# Patient Record
Sex: FEMALE | Race: WHITE | Employment: FULL TIME | URBAN - NONMETROPOLITAN AREA
[De-identification: names, ages, dates, MRNs, and addresses within clinical notes are randomized per-mention and may not be internally consistent; named-entity substitution may affect disease eponyms.]

---

## 2017-01-26 ENCOUNTER — HOSPITAL ENCOUNTER (EMERGENCY)
Age: 47
Discharge: HOME | End: 2017-01-26
Payer: MEDICAID

## 2017-01-26 VITALS
SYSTOLIC BLOOD PRESSURE: 125 MMHG | HEART RATE: 111 BPM | DIASTOLIC BLOOD PRESSURE: 85 MMHG | RESPIRATION RATE: 18 BRPM | TEMPERATURE: 97.8 F | OXYGEN SATURATION: 97 %

## 2017-01-26 DIAGNOSIS — K52.9 GASTROENTERITIS: Primary | ICD-10-CM

## 2017-01-26 DIAGNOSIS — R82.71 BACTERIA IN URINE: ICD-10-CM

## 2017-01-26 LAB
ALBUMIN SERPL-MCNC: 4.3 G/DL (ref 3.5–5.2)
ALP BLD-CCNC: 81 U/L (ref 35–104)
ALT SERPL-CCNC: 15 U/L (ref 5–33)
ANION GAP SERPL CALCULATED.3IONS-SCNC: 14 MMOL/L (ref 7–19)
AST SERPL-CCNC: 14 U/L (ref 5–32)
BACTERIA: ABNORMAL /HPF
BILIRUB SERPL-MCNC: 0.6 MG/DL (ref 0.2–1.2)
BILIRUBIN URINE: NEGATIVE
BLOOD, URINE: ABNORMAL
BUN BLDV-MCNC: 11 MG/DL (ref 6–20)
CALCIUM SERPL-MCNC: 8.6 MG/DL (ref 8.6–10)
CHLORIDE BLD-SCNC: 103 MMOL/L (ref 98–111)
CLARITY: ABNORMAL
CO2: 25 MMOL/L (ref 22–29)
COLOR: YELLOW
CREAT SERPL-MCNC: 0.5 MG/DL (ref 0.5–0.9)
EPITHELIAL CELLS, UA: 6 /HPF (ref 0–5)
GFR NON-AFRICAN AMERICAN: >60
GLOBULIN: 3 G/DL
GLUCOSE BLD-MCNC: 142 MG/DL (ref 74–109)
GLUCOSE URINE: NEGATIVE MG/DL
HCG(URINE) PREGNANCY TEST: NEGATIVE
HCT VFR BLD CALC: 45.6 % (ref 37–47)
HEMOGLOBIN: 16.3 G/DL (ref 12–16)
HYALINE CASTS: 10 /HPF (ref 0–8)
KETONES, URINE: NEGATIVE MG/DL
LEUKOCYTE ESTERASE, URINE: ABNORMAL
LIPASE: 33 U/L (ref 13–60)
MCH RBC QN AUTO: 33.3 PG (ref 27–31)
MCHC RBC AUTO-ENTMCNC: 35.7 G/DL (ref 33–37)
MCV RBC AUTO: 93.3 FL (ref 81–99)
NITRITE, URINE: NEGATIVE
PDW BLD-RTO: 14.7 % (ref 11.5–14.5)
PH UA: 5.5
PLATELET # BLD: 184 K/UL (ref 130–400)
PMV BLD AUTO: 8.7 FL (ref 7.4–10.4)
POTASSIUM SERPL-SCNC: 4.2 MMOL/L (ref 3.5–5)
PROTEIN UA: NEGATIVE MG/DL
RAPID INFLUENZA  B AGN: NEGATIVE
RAPID INFLUENZA A AGN: NEGATIVE
RBC # BLD: 4.89 M/UL (ref 4.2–5.4)
RBC UA: 14 /HPF (ref 0–4)
SODIUM BLD-SCNC: 142 MMOL/L (ref 136–145)
SPECIFIC GRAVITY UA: 1.03
TOTAL PROTEIN: 7.3 G/DL (ref 6.6–8.7)
UROBILINOGEN, URINE: 0.2 E.U./DL
WBC # BLD: 13.1 K/UL (ref 4.8–10.8)
WBC UA: 2 /HPF (ref 0–5)

## 2017-01-26 PROCEDURE — 87086 URINE CULTURE/COLONY COUNT: CPT

## 2017-01-26 PROCEDURE — 87804 INFLUENZA ASSAY W/OPTIC: CPT

## 2017-01-26 PROCEDURE — 81001 URINALYSIS AUTO W/SCOPE: CPT

## 2017-01-26 PROCEDURE — 85027 COMPLETE CBC AUTOMATED: CPT

## 2017-01-26 PROCEDURE — 6360000002 HC RX W HCPCS: Performed by: NURSE PRACTITIONER

## 2017-01-26 PROCEDURE — 80053 COMPREHEN METABOLIC PANEL: CPT

## 2017-01-26 PROCEDURE — 83690 ASSAY OF LIPASE: CPT

## 2017-01-26 PROCEDURE — 99283 EMERGENCY DEPT VISIT LOW MDM: CPT

## 2017-01-26 PROCEDURE — 99282 EMERGENCY DEPT VISIT SF MDM: CPT | Performed by: NURSE PRACTITIONER

## 2017-01-26 PROCEDURE — 81025 URINE PREGNANCY TEST: CPT

## 2017-01-26 PROCEDURE — 2580000003 HC RX 258: Performed by: NURSE PRACTITIONER

## 2017-01-26 PROCEDURE — 96374 THER/PROPH/DIAG INJ IV PUSH: CPT

## 2017-01-26 PROCEDURE — 36415 COLL VENOUS BLD VENIPUNCTURE: CPT

## 2017-01-26 RX ORDER — ONDANSETRON 4 MG/1
4 TABLET, ORALLY DISINTEGRATING ORAL EVERY 8 HOURS PRN
Qty: 15 TABLET | Refills: 0 | Status: SHIPPED | OUTPATIENT
Start: 2017-01-26 | End: 2018-08-06

## 2017-01-26 RX ORDER — 0.9 % SODIUM CHLORIDE 0.9 %
1000 INTRAVENOUS SOLUTION INTRAVENOUS ONCE
Status: COMPLETED | OUTPATIENT
Start: 2017-01-26 | End: 2017-01-26

## 2017-01-26 RX ORDER — CIPROFLOXACIN 500 MG/1
500 TABLET, FILM COATED ORAL 2 TIMES DAILY
Qty: 10 TABLET | Refills: 0 | Status: SHIPPED | OUTPATIENT
Start: 2017-01-26 | End: 2017-01-31

## 2017-01-26 RX ORDER — ONDANSETRON 2 MG/ML
4 INJECTION INTRAMUSCULAR; INTRAVENOUS ONCE
Status: COMPLETED | OUTPATIENT
Start: 2017-01-26 | End: 2017-01-26

## 2017-01-26 RX ADMIN — ONDANSETRON 4 MG: 2 INJECTION INTRAMUSCULAR; INTRAVENOUS at 09:21

## 2017-01-26 RX ADMIN — SODIUM CHLORIDE 1000 ML: 9 INJECTION, SOLUTION INTRAVENOUS at 09:21

## 2017-01-26 ASSESSMENT — ENCOUNTER SYMPTOMS
DIARRHEA: 1
NAUSEA: 1
RESPIRATORY NEGATIVE: 1
VOMITING: 1

## 2017-01-28 LAB — URINE CULTURE, ROUTINE: NORMAL

## 2017-04-06 ENCOUNTER — OFFICE VISIT (OUTPATIENT)
Dept: RETAIL CLINIC | Facility: CLINIC | Age: 47
End: 2017-04-06

## 2017-04-06 VITALS
HEART RATE: 87 BPM | DIASTOLIC BLOOD PRESSURE: 84 MMHG | TEMPERATURE: 98.1 F | SYSTOLIC BLOOD PRESSURE: 110 MMHG | OXYGEN SATURATION: 98 %

## 2017-04-06 DIAGNOSIS — R31.9 URINARY TRACT INFECTION WITH HEMATURIA, SITE UNSPECIFIED: Primary | ICD-10-CM

## 2017-04-06 DIAGNOSIS — N39.0 URINARY TRACT INFECTION WITH HEMATURIA, SITE UNSPECIFIED: Primary | ICD-10-CM

## 2017-04-06 LAB
BILIRUB BLD-MCNC: NEGATIVE MG/DL
CLARITY, POC: ABNORMAL
COLOR UR: ABNORMAL
GLUCOSE UR STRIP-MCNC: NEGATIVE MG/DL
KETONES UR QL: NEGATIVE
LEUKOCYTE EST, POC: ABNORMAL
NITRITE UR-MCNC: NEGATIVE MG/ML
PH UR: 6.5 [PH] (ref 5–8)
PROT UR STRIP-MCNC: ABNORMAL MG/DL
RBC # UR STRIP: ABNORMAL /UL
SP GR UR: 1.02 (ref 1–1.03)
UROBILINOGEN UR QL: ABNORMAL

## 2017-04-06 PROCEDURE — 81003 URINALYSIS AUTO W/O SCOPE: CPT | Performed by: NURSE PRACTITIONER

## 2017-04-06 PROCEDURE — 99212 OFFICE O/P EST SF 10 MIN: CPT | Performed by: NURSE PRACTITIONER

## 2017-04-06 RX ORDER — PHENAZOPYRIDINE HYDROCHLORIDE 200 MG/1
200 TABLET, FILM COATED ORAL 3 TIMES DAILY PRN
Qty: 6 TABLET | Refills: 0 | Status: SHIPPED | OUTPATIENT
Start: 2017-04-06 | End: 2017-04-08

## 2017-04-06 RX ORDER — SULFAMETHOXAZOLE AND TRIMETHOPRIM 800; 160 MG/1; MG/1
1 TABLET ORAL 2 TIMES DAILY
Qty: 20 TABLET | Refills: 0 | Status: SHIPPED | OUTPATIENT
Start: 2017-04-06 | End: 2017-04-16

## 2017-04-06 NOTE — PATIENT INSTRUCTIONS
Increase fluid intake  Avoid caffeine and carbonation  May use Pyridium today and tomorrow; avoid use at this time to be sure symptoms are improving   Follow up with Urgent Care or other provider at completion of therapy for recheck  If symptoms worsen or you develop fever, follow up with Urgent care or other provider that can check urine and send for culture       Urinary Tract Infection, Adult  A urinary tract infection (UTI) is an infection of any part of the urinary tract, which includes the kidneys, ureters, bladder, and urethra. These organs make, store, and get rid of urine in the body. UTI can be a bladder infection (cystitis) or kidney infection (pyelonephritis).  CAUSES  This infection may be caused by fungi, viruses, or bacteria. Bacteria are the most common cause of UTIs. This condition can also be caused by repeated incomplete emptying of the bladder during urination.   RISK FACTORS  This condition is more likely to develop if:   · You ignore your need to urinate or hold urine for long periods of time.  · You do not empty your bladder completely during urination.  · You wipe back to front after urinating or having a bowel movement, if you are female.  · You are uncircumcised, if you are male.    · You are constipated.  · You have a urinary catheter that stays in place (indwelling).  · You have a weak defense (immune) system.  · You have a medical condition that affects your bowels, kidneys, or bladder.  · You have diabetes.  · You take antibiotic medicines frequently or for long periods of time, and the antibiotics no longer work well against certain types of infections (antibiotic resistance).  · You take medicines that irritate your urinary tract.  · You are exposed to chemicals that irritate your urinary tract.  · You are female.  SYMPTOMS   Symptoms of this condition include:   · Fever.    · Frequent urination or passing small amounts of urine frequently.    · Needing to urinate urgently.    · Pain  or burning with urination.    · Urine that smells bad or unusual.    · Cloudy urine.    · Pain in the lower abdomen or back.    · Trouble urinating.    · Blood in the urine.    · Vomiting or being less hungry than normal.     · Diarrhea or abdominal pain.    · Vaginal discharge, if you are female.  DIAGNOSIS  This condition is diagnosed with a medical history and physical exam. You will also need to provide a urine sample to test your urine. Other tests may be done, including:    · Blood tests.    · Sexually transmitted disease (STD) testing.     If you have had more than one UTI, a cystoscopy or imaging studies may be done to determine the cause of the infections.   TREATMENT   Treatment for this condition often includes a combination of two or more of the following:   · Antibiotic medicine.    · Other medicines to treat less common causes of UTI.    · Over-the-counter medicines to treat pain.    · Drinking enough water to stay hydrated.  HOME CARE INSTRUCTIONS  · Take over-the-counter and prescription medicines only as told by your health care provider.  · If you were prescribed an antibiotic, take it as told by your health care provider. Do not stop taking the antibiotic even if you start to feel better.  · Avoid alcohol, caffeine, tea, and carbonated beverages. They can irritate your bladder.  · Drink enough fluid to keep your urine clear or pale yellow.  · Keep all follow-up visits as told by your health care provider. This is important.  · Make sure to:    Empty your bladder often and completely. Do not hold urine for long periods of time.    Empty your bladder before and after sex.    Wipe from front to back after a bowel movement if you are female. Use each tissue one time when you wipe.  SEEK MEDICAL CARE IF:   · You have back pain.    · You have a fever.  · You feel nauseous or vomit.    · Your symptoms do not get better after 3 days.     · Your symptoms go away and then return.  SEEK IMMEDIATE MEDICAL  CARE IF:   · You have severe back pain or lower abdominal pain.    · You are vomiting and cannot keep down any medicines or water.     This information is not intended to replace advice given to you by your health care provider. Make sure you discuss any questions you have with your health care provider.     Document Released: 09/27/2006 Document Revised: 01/13/2017 Document Reviewed: 11/07/2016  [employer] Interactive Patient Education ©2016 [employer]

## 2017-04-06 NOTE — PROGRESS NOTES
Subjective   Ayanna Lugo is a 47 y.o. female.     Urinary Frequency    This is a recurrent (Last UTI in December) problem. The current episode started in the past 7 days (4-5 days; Has had to hold urine a lot over the weekend). The quality of the pain is described as burning. There has been no fever. Associated symptoms include frequency and urgency. Pertinent negatives include no flank pain, nausea or vomiting. Associated symptoms comments: Mild lower back pain  .        The following portions of the patient's history were reviewed and updated as appropriate: allergies, current medications, past family history, past medical history, past social history, past surgical history and problem list.    Review of Systems   Constitutional: Negative for fever.   Gastrointestinal: Negative for abdominal pain, nausea and vomiting.   Genitourinary: Positive for dysuria, frequency and urgency. Negative for flank pain, pelvic pain and vaginal bleeding.   Musculoskeletal: Positive for back pain (Mild lower back).       Objective   Physical Exam   Constitutional: She appears well-developed and well-nourished. No distress.   Cardiovascular: Normal rate, regular rhythm and normal heart sounds.  Exam reveals no gallop and no friction rub.    No murmur heard.  Pulmonary/Chest: Effort normal and breath sounds normal. No respiratory distress. She has no wheezes. She has no rales. She exhibits no tenderness.   Abdominal: Soft. She exhibits no distension. There is no tenderness. There is no CVA tenderness.   Neurological: She is alert.   Skin: Skin is warm. She is not diaphoretic.   Psychiatric: She has a normal mood and affect. Her behavior is normal.       Assessment/Plan   Ayanna was seen today for urinary frequency.    Diagnoses and all orders for this visit:    Urinary tract infection with hematuria, site unspecified  -     POC Urinalysis Dipstick, Automated  -     sulfamethoxazole-trimethoprim (BACTRIM DS,SEPTRA DS) 800-160 MG  per tablet; Take 1 tablet by mouth 2 (Two) Times a Day for 10 days.  -     phenazopyridine (PYRIDIUM) 200 MG tablet; Take 1 tablet by mouth 3 (Three) Times a Day As Needed for bladder spasms for up to 2 days.        Increase fluid intake  Avoid caffeine and carbonation  May use Pyridium today and tomorrow; avoid use at this time to be sure symptoms are improving   Follow up with Urgent Care or other provider at completion of therapy for recheck  If symptoms worsen or you develop fever, follow up with Urgent care or other provider that can check urine and send for culture

## 2017-08-29 ENCOUNTER — OFFICE VISIT (OUTPATIENT)
Dept: RETAIL CLINIC | Facility: CLINIC | Age: 47
End: 2017-08-29

## 2017-08-29 VITALS
DIASTOLIC BLOOD PRESSURE: 78 MMHG | OXYGEN SATURATION: 98 % | SYSTOLIC BLOOD PRESSURE: 138 MMHG | TEMPERATURE: 98.3 F | HEART RATE: 70 BPM

## 2017-08-29 DIAGNOSIS — J06.9 ACUTE URI: Primary | ICD-10-CM

## 2017-08-29 PROCEDURE — 99213 OFFICE O/P EST LOW 20 MIN: CPT | Performed by: NURSE PRACTITIONER

## 2017-08-29 RX ORDER — GUAIFENESIN 600 MG/1
1200 TABLET, EXTENDED RELEASE ORAL 2 TIMES DAILY
Qty: 40 TABLET | Refills: 0 | Status: SHIPPED | OUTPATIENT
Start: 2017-08-29 | End: 2017-09-08

## 2017-08-29 RX ORDER — METHYLPREDNISOLONE 4 MG/1
TABLET ORAL
Qty: 1 EACH | Refills: 0 | Status: SHIPPED | OUTPATIENT
Start: 2017-08-29 | End: 2019-02-19

## 2017-08-29 NOTE — PROGRESS NOTES
Subjective   Ayanna Lugo is a 47 y.o. female.     Cough   This is a new problem. The current episode started in the past 7 days (>2 days, on Sunday worked a double and started to feel worse. ). The problem has been gradually worsening. Associated symptoms include a fever (Has not checked; has felt feverish), headaches, nasal congestion and postnasal drip. Pertinent negatives include no ear pain (Some pressure) or shortness of breath. The symptoms are aggravated by lying down. Treatments tried: Mucinex Night time. The treatment provided mild relief. Her past medical history is significant for environmental allergies. There is no history of asthma, bronchitis or pneumonia.        The following portions of the patient's history were reviewed and updated as appropriate: allergies, current medications, past family history, past medical history, past social history, past surgical history and problem list.    Review of Systems   Constitutional: Positive for fever (Has not checked; has felt feverish). Negative for appetite change.   HENT: Positive for congestion, postnasal drip and sinus pressure. Negative for ear pain (Some pressure).    Eyes: Negative.    Respiratory: Positive for cough. Negative for shortness of breath.    Gastrointestinal: Negative for diarrhea, nausea and vomiting.   Allergic/Immunologic: Positive for environmental allergies.   Neurological: Positive for headaches.       Objective   Physical Exam   Constitutional: She appears well-developed and well-nourished. No distress.   HENT:   Right Ear: External ear normal. Tympanic membrane is not erythematous and not bulging.   Left Ear: External ear normal. Tympanic membrane is not erythematous and not bulging.   Nose: Right sinus exhibits maxillary sinus tenderness and frontal sinus tenderness. Left sinus exhibits maxillary sinus tenderness and frontal sinus tenderness.   Mouth/Throat: Posterior oropharyngeal erythema present. No oropharyngeal  exudate.   Neck: Neck supple.   Cardiovascular: Normal rate, regular rhythm and normal heart sounds.  Exam reveals no gallop and no friction rub.    No murmur heard.  Pulmonary/Chest: Effort normal and breath sounds normal. No respiratory distress. She has no wheezes. She has no rales.   Lymphadenopathy:     She has no cervical adenopathy.   Neurological: She is alert.   Skin: Skin is warm. She is not diaphoretic.   Psychiatric: She has a normal mood and affect. Her behavior is normal.       Assessment/Plan   Ayanna was seen today for cough.    Diagnoses and all orders for this visit:    Acute URI  -     MethylPREDNISolone (MEDROL, ROSANA,) 4 MG tablet; Take as directed on package instructions.  -     guaiFENesin (MUCINEX) 600 MG 12 hr tablet; Take 2 tablets by mouth 2 (Two) Times a Day for 10 days.      Increase fluid intake  Warm salt water gargles as needed for sore throat  Do not over suppress cough  Saline nasal spray  Plain Mucinex for cough/congestion   May use Nyquil at bedtime  If no improvement over next 2-3 days or symptoms worsen, follow up here or with PCP

## 2017-08-29 NOTE — PATIENT INSTRUCTIONS
"Increase fluid intake  Warm salt water gargles as needed for sore throat  Do not over suppress cough  Saline nasal spray  Plain Mucinex for cough/congestion   May use Nyquil at bedtime  If no improvement over next 2-3 days or symptoms worsen, follow up here or with PCP      Upper Respiratory Infection, Adult  Most upper respiratory infections (URIs) are a viral infection of the air passages leading to the lungs. A URI affects the nose, throat, and upper air passages. The most common type of URI is nasopharyngitis and is typically referred to as \"the common cold.\"  URIs run their course and usually go away on their own. Most of the time, a URI does not require medical attention, but sometimes a bacterial infection in the upper airways can follow a viral infection. This is called a secondary infection. Sinus and middle ear infections are common types of secondary upper respiratory infections.  Bacterial pneumonia can also complicate a URI. A URI can worsen asthma and chronic obstructive pulmonary disease (COPD). Sometimes, these complications can require emergency medical care and may be life threatening.   CAUSES  Almost all URIs are caused by viruses. A virus is a type of germ and can spread from one person to another.   RISKS FACTORS  You may be at risk for a URI if:   · You smoke.    · You have chronic heart or lung disease.  · You have a weakened defense (immune) system.    · You are very young or very old.    · You have nasal allergies or asthma.  · You work in crowded or poorly ventilated areas.  · You work in health care facilities or schools.  SIGNS AND SYMPTOMS   Symptoms typically develop 2-3 days after you come in contact with a cold virus. Most viral URIs last 7-10 days. However, viral URIs from the influenza virus (flu virus) can last 14-18 days and are typically more severe. Symptoms may include:   · Runny or stuffy (congested) nose.    · Sneezing.    · Cough.    · Sore throat.    · Headache. "    · Fatigue.    · Fever.    · Loss of appetite.    · Pain in your forehead, behind your eyes, and over your cheekbones (sinus pain).  · Muscle aches.    DIAGNOSIS   Your health care provider may diagnose a URI by:  · Physical exam.  · Tests to check that your symptoms are not due to another condition such as:  ¨ Strep throat.  ¨ Sinusitis.  ¨ Pneumonia.  ¨ Asthma.  TREATMENT   A URI goes away on its own with time. It cannot be cured with medicines, but medicines may be prescribed or recommended to relieve symptoms. Medicines may help:  · Reduce your fever.  · Reduce your cough.  · Relieve nasal congestion.  HOME CARE INSTRUCTIONS   · Take medicines only as directed by your health care provider.    · Gargle warm saltwater or take cough drops to comfort your throat as directed by your health care provider.  · Use a warm mist humidifier or inhale steam from a shower to increase air moisture. This may make it easier to breathe.  · Drink enough fluid to keep your urine clear or pale yellow.    · Eat soups and other clear broths and maintain good nutrition.    · Rest as needed.    · Return to work when your temperature has returned to normal or as your health care provider advises. You may need to stay home longer to avoid infecting others. You can also use a face mask and careful hand washing to prevent spread of the virus.  · Increase the usage of your inhaler if you have asthma.    · Do not use any tobacco products, including cigarettes, chewing tobacco, or electronic cigarettes. If you need help quitting, ask your health care provider.  PREVENTION   The best way to protect yourself from getting a cold is to practice good hygiene.   · Avoid oral or hand contact with people with cold symptoms.    · Wash your hands often if contact occurs.    There is no clear evidence that vitamin C, vitamin E, echinacea, or exercise reduces the chance of developing a cold. However, it is always recommended to get plenty of rest,  exercise, and practice good nutrition.   SEEK MEDICAL CARE IF:   · You are getting worse rather than better.    · Your symptoms are not controlled by medicine.    · You have chills.  · You have worsening shortness of breath.  · You have brown or red mucus.  · You have yellow or brown nasal discharge.  · You have pain in your face, especially when you bend forward.  · You have a fever.  · You have swollen neck glands.  · You have pain while swallowing.  · You have white areas in the back of your throat.  SEEK IMMEDIATE MEDICAL CARE IF:   · You have severe or persistent:    Headache.    Ear pain.    Sinus pain.    Chest pain.  · You have chronic lung disease and any of the following:    Wheezing.    Prolonged cough.    Coughing up blood.    A change in your usual mucus.  · You have a stiff neck.  · You have changes in your:    Vision.    Hearing.    Thinking.    Mood.  MAKE SURE YOU:   · Understand these instructions.  · Will watch your condition.  · Will get help right away if you are not doing well or get worse.     This information is not intended to replace advice given to you by your health care provider. Make sure you discuss any questions you have with your health care provider.     Document Released: 06/13/2002 Document Revised: 05/03/2016 Document Reviewed: 03/25/2015  [employer] Interactive Patient Education ©2017 [employer]

## 2017-10-25 ENCOUNTER — OFFICE VISIT (OUTPATIENT)
Dept: RETAIL CLINIC | Facility: CLINIC | Age: 47
End: 2017-10-25

## 2017-10-25 DIAGNOSIS — L73.1 PSEUDOFOLLICULITIS BARBAE: Primary | ICD-10-CM

## 2017-10-25 PROCEDURE — 99212 OFFICE O/P EST SF 10 MIN: CPT | Performed by: NURSE PRACTITIONER

## 2017-10-25 NOTE — PROGRESS NOTES
"  Chief Complaint   Patient presents with   • Wound Infection     Subjective   Ayanna Lugo is a 47 y.o. female who presents to the clinic today with complaints small lump where she shaved her pubic hair. It is at the top of the hair line. She used a razor from a friend who was treated for a boil and told likely was infected with staph. She just wants \"someone to look at it and make sure she doesn't have an infection.\"  Wound Infection   This is a new problem. The current episode started in the past 7 days. The problem occurs constantly. The problem has been unchanged. Pertinent negatives include no abdominal pain, anorexia, arthralgias, change in bowel habit, chest pain, chills, congestion, coughing, diaphoresis, fatigue, fever, headaches, joint swelling, myalgias, nausea, neck pain, numbness, rash, sore throat, swollen glands, urinary symptoms, vertigo, visual change, vomiting or weakness. Nothing aggravates the symptoms. She has tried nothing for the symptoms.         Current Outpatient Prescriptions:   •  Cyanocobalamin (VITAMIN B-12 PO), Take  by mouth., Disp: , Rfl:   •  MethylPREDNISolone (MEDROL, ROSANA,) 4 MG tablet, Take as directed on package instructions., Disp: 1 each, Rfl: 0  •  Multiple Vitamins-Minerals (MULTIVITAMIN ADULT PO), Take  by mouth., Disp: , Rfl:     Allergies:  Review of patient's allergies indicates no known allergies.    History reviewed. No pertinent past medical history.  Past Surgical History:   Procedure Laterality Date   • EXPLORATORY LAPAROTOMY     • TONSILLECTOMY       Family History   Problem Relation Age of Onset   • No Known Problems Mother    • No Known Problems Father      Social History   Substance Use Topics   • Smoking status: Current Every Day Smoker     Packs/day: 1.00     Types: Cigarettes   • Smokeless tobacco: None   • Alcohol use No       Review of Systems  Review of Systems   Constitutional: Negative for chills, diaphoresis, fatigue and fever.   HENT: Negative " for congestion and sore throat.    Respiratory: Negative for cough.    Cardiovascular: Negative for chest pain.   Gastrointestinal: Negative for abdominal pain, anorexia, change in bowel habit, nausea and vomiting.   Musculoskeletal: Negative for arthralgias, joint swelling, myalgias and neck pain.   Skin: Negative for rash.   Neurological: Negative for vertigo, weakness, numbness and headaches.       Objective   There were no vitals taken for this visit.      Physical Exam   Constitutional: She is oriented to person, place, and time. She appears well-developed and well-nourished.   Neurological: She is alert and oriented to person, place, and time.   Skin: Skin is warm and dry. Rash (At site of shaving on pubis area,small papules and mild erythema around papules at site of hair follicles. No obvious signs of furuncle or carbuncle. No signs of infection.  ) noted.   Psychiatric: She has a normal mood and affect. Her behavior is normal.   Vitals reviewed.      Assessment/Plan     Ayanna was seen today for wound infection.    Diagnoses and all orders for this visit:    Pseudofolliculitis barbae    Discussed with her no obvious signs of infection or boil. If boil were to develop, she would need to use warm moist compresses 3-4 times per day. If this does not resolve then she will need follow up. Do not use another person's razor or personal hygiene products.

## 2018-02-12 ENCOUNTER — OFFICE VISIT (OUTPATIENT)
Dept: URGENT CARE | Age: 48
End: 2018-02-12
Payer: MEDICAID

## 2018-02-12 VITALS
DIASTOLIC BLOOD PRESSURE: 70 MMHG | WEIGHT: 180 LBS | BODY MASS INDEX: 27.28 KG/M2 | HEART RATE: 102 BPM | TEMPERATURE: 99.1 F | OXYGEN SATURATION: 98 % | HEIGHT: 68 IN | SYSTOLIC BLOOD PRESSURE: 130 MMHG | RESPIRATION RATE: 20 BRPM

## 2018-02-12 DIAGNOSIS — R52 BODY ACHES: ICD-10-CM

## 2018-02-12 DIAGNOSIS — J02.9 SORE THROAT: ICD-10-CM

## 2018-02-12 DIAGNOSIS — B34.9 VIRAL ILLNESS: Primary | ICD-10-CM

## 2018-02-12 LAB
INFLUENZA A ANTIBODY: NEGATIVE
INFLUENZA B ANTIBODY: NEGATIVE
S PYO AG THROAT QL: NORMAL

## 2018-02-12 PROCEDURE — 87804 INFLUENZA ASSAY W/OPTIC: CPT | Performed by: NURSE PRACTITIONER

## 2018-02-12 PROCEDURE — 87880 STREP A ASSAY W/OPTIC: CPT | Performed by: NURSE PRACTITIONER

## 2018-02-12 PROCEDURE — 99213 OFFICE O/P EST LOW 20 MIN: CPT | Performed by: NURSE PRACTITIONER

## 2018-02-12 RX ORDER — BENZONATATE 100 MG/1
100 CAPSULE ORAL 3 TIMES DAILY PRN
Qty: 30 CAPSULE | Refills: 0 | Status: SHIPPED | OUTPATIENT
Start: 2018-02-12 | End: 2018-02-19

## 2018-02-12 RX ORDER — METHYLPREDNISOLONE 4 MG/1
TABLET ORAL
Qty: 1 KIT | Refills: 0 | Status: SHIPPED | OUTPATIENT
Start: 2018-02-12 | End: 2018-02-18

## 2018-02-12 ASSESSMENT — ENCOUNTER SYMPTOMS
GASTROINTESTINAL NEGATIVE: 1
COUGH: 1
SORE THROAT: 1

## 2018-02-12 NOTE — PATIENT INSTRUCTIONS
trouble breathing. ? · You passed out (lost consciousness). ?Call your doctor now or seek immediate medical care if:  ? · You seem to be getting much sicker. ? · You have a new or higher fever. ? · You have blood in your stools. ? · You have new belly pain, or your pain gets worse. ? · You have a new rash. ? Watch closely for changes in your health, and be sure to contact your doctor if:  ? · You start to get better and then get worse. ? · You do not get better as expected. Where can you learn more? Go to https://[employer] Sciencepepiceweb.[employer]. org and sign in to your [employer] account. Enter K879 in the Kitchenbug box to learn more about \"Viral Infections: Care Instructions. \"     If you do not have an account, please click on the \"Sign Up Now\" link. Current as of: March 3, 2017  Content Version: 11.5  © 4366-9300 [employer]. Care instructions adapted under license by [facility] ([facility]). If you have questions about a medical condition or this instruction, always ask your healthcare professional. [place] [address] any warranty or liability for your use of this information. 1. Rest and increase fluids  2. Take tessalon as needed for coughing  3. Take medrol as directed  4. Monitor for fever and treat as needed   5. Diatherix done in office and we will call with results  6.  If patient is not improving or developing any new/worsening symptoms then RTC, prn or follow up with PCP

## 2018-02-12 NOTE — PROGRESS NOTES
[address] [address]  Dept: [phone]  Loc: [phone]    Devon Ortiz is a 49 y.o. female who presents today for her medical conditions/complaints as noted below. Devon Ortiz is c/o of Fever; Generalized Body Aches; Chills; Pharyngitis; Cough; and Nasal Congestion        HPI:     HPI     Patient presents to office complaining of body aches, chills and fever that started today. She reports that her son was diagnosed with the flu last week. She reports sore throat and cough as well. She reports that she didn't check her temperature. She reports that her cough is somewhat productive. She reports one episode of vomiting on Saturday. She denies any diarrhea. She reports that she was treated for Flu A three weeks ago. History reviewed. No pertinent past medical history. History reviewed. No pertinent surgical history. History reviewed. No pertinent family history. Social History   Substance Use Topics    Smoking status: Current Every Day Smoker    Smokeless tobacco: Current User    Alcohol use No      Current Outpatient Prescriptions   Medication Sig Dispense Refill    methylPREDNISolone (MEDROL, TYRA,) 4 MG tablet Take by mouth. 1 kit 0    benzonatate (TESSALON PERLES) 100 MG capsule Take 1 capsule by mouth 3 times daily as needed for Cough 30 capsule 0     No current facility-administered medications for this visit. No Known Allergies    Health Maintenance   Topic Date Due    HIV screen  03/12/1984    DTaP/Tdap/Td vaccine (1 - Tdap) 03/12/1988    Cervical cancer screen  03/12/1990    Lipid screen  03/12/2009    Diabetes screen  03/12/2009    Flu vaccine (1) 09/01/2017       Subjective:      Review of Systems   Constitutional: Positive for chills and fever. Body aches     HENT: Positive for sore throat. Respiratory: Positive for cough. Cardiovascular: Negative.

## 2018-02-14 ENCOUNTER — TELEPHONE (OUTPATIENT)
Dept: URGENT CARE | Age: 48
End: 2018-02-14

## 2018-08-06 ENCOUNTER — OFFICE VISIT (OUTPATIENT)
Dept: URGENT CARE | Age: 48
End: 2018-08-06
Payer: MEDICAID

## 2018-08-06 VITALS
WEIGHT: 190 LBS | RESPIRATION RATE: 20 BRPM | BODY MASS INDEX: 29.82 KG/M2 | SYSTOLIC BLOOD PRESSURE: 158 MMHG | HEART RATE: 88 BPM | DIASTOLIC BLOOD PRESSURE: 81 MMHG | HEIGHT: 67 IN | TEMPERATURE: 98.5 F | OXYGEN SATURATION: 98 %

## 2018-08-06 DIAGNOSIS — R03.0 ELEVATED BLOOD PRESSURE READING: ICD-10-CM

## 2018-08-06 DIAGNOSIS — J02.9 SORE THROAT: ICD-10-CM

## 2018-08-06 DIAGNOSIS — J01.90 ACUTE SINUSITIS, RECURRENCE NOT SPECIFIED, UNSPECIFIED LOCATION: Primary | ICD-10-CM

## 2018-08-06 DIAGNOSIS — J02.9 ACUTE PHARYNGITIS, UNSPECIFIED ETIOLOGY: ICD-10-CM

## 2018-08-06 DIAGNOSIS — F17.200 SMOKER: ICD-10-CM

## 2018-08-06 LAB — S PYO AG THROAT QL: NORMAL

## 2018-08-06 PROCEDURE — 99213 OFFICE O/P EST LOW 20 MIN: CPT | Performed by: SPECIALIST

## 2018-08-06 PROCEDURE — 87880 STREP A ASSAY W/OPTIC: CPT | Performed by: SPECIALIST

## 2018-08-06 RX ORDER — AMOXICILLIN 875 MG/1
875 TABLET, COATED ORAL 2 TIMES DAILY
Qty: 20 TABLET | Refills: 0 | Status: SHIPPED | OUTPATIENT
Start: 2018-08-06 | End: 2018-08-16

## 2018-08-06 ASSESSMENT — ENCOUNTER SYMPTOMS
SORE THROAT: 1
SINUS PRESSURE: 1
SINUS PAIN: 1

## 2018-08-06 NOTE — PROGRESS NOTES
 POCT rapid strep A       No Follow-up on file. Orders Placed This Encounter   Procedures   [facility] Yaya Holguin MD     Referral Priority:   Routine     Referral Type:   Eval and Treat     Referral Reason:   Specialty Services Required     Referred to Provider:   Faraz Todd MD     Requested Specialty:   Family Medicine     Number of Visits Requested:   1    POCT rapid strep A     Orders Placed This Encounter   Medications    amoxicillin (AMOXIL) 875 MG tablet     Sig: Take 1 tablet by mouth 2 times daily for 10 days     Dispense:  20 tablet     Refill:  0       Patient given educational materials - see patient instructions. Discussed use, benefit, and side effects of prescribed medications. All patient questions answered. Pt voiced understanding. Reviewed health maintenance. Instructed to continue current medications, diet and exercise. Patient agreed with treatment plan. Follow up as directed. Patient Instructions       Patient Education        Elevated Blood Pressure: Care Instructions  Your Care Instructions    Blood pressure is a measure of how hard the blood pushes against the walls of your arteries. It's normal for blood pressure to go up and down throughout the day. But if it stays up over time, you have high blood pressure. Two numbers tell you your blood pressure. The first number is the systolic pressure. It shows how hard the blood pushes when your heart is pumping. The second number is the diastolic pressure. It shows how hard the blood pushes between heartbeats, when your heart is relaxed and filling with blood. An ideal blood pressure in adults is less than 120/80 (say \"120 over 80\"). High blood pressure is 140/90 or higher. You have high blood pressure if your top number is 140 or higher or your bottom number is 90 or higher, or both. The main test for high blood pressure is simple, fast, and painless.  To diagnose high blood pressure, your doctor will test your blood pressure at different times. After testing your blood pressure, your doctor may ask you to test it again when you are home. If you are diagnosed with high blood pressure, you can work with your doctor to make a long-term plan to manage it. Follow-up care is a key part of your treatment and safety. Be sure to make and go to all appointments, and call your doctor if you are having problems. It's also a good idea to know your test results and keep a list of the medicines you take. How can you care for yourself at home? · Do not smoke. Smoking increases your risk for heart attack and stroke. If you need help quitting, talk to your doctor about stop-smoking programs and medicines. These can increase your chances of quitting for good. · Stay at a healthy weight. · Try to limit how much sodium you eat to less than 2,300 milligrams (mg) a day. Your doctor may ask you to try to eat less than 1,500 mg a day. · Be physically active. Get at least 30 minutes of exercise on most days of the week. Walking is a good choice. You also may want to do other activities, such as running, swimming, cycling, or playing tennis or team sports. · Avoid or limit alcohol. Talk to your doctor about whether you can drink any alcohol. · Eat plenty of fruits, vegetables, and low-fat dairy products. Eat less saturated and total fats. · Learn how to check your blood pressure at home. When should you call for help? Call your doctor now or seek immediate medical care if:    · Your blood pressure is much higher than normal (such as 180/110 or higher).     · You think high blood pressure is causing symptoms such as:  ¨ Severe headache. ¨ Blurry vision.    Watch closely for changes in your health, and be sure to contact your doctor if:    · You do not get better as expected. Where can you learn more? Go to https://denilson.AlphaBoost. org and sign in to your eGames account.  Enter H804 in the Search Health Information box to learn more about \"Elevated Blood Pressure: Care Instructions. \"     If you do not have an account, please click on the \"Sign Up Now\" link. Current as of: May 10, 2017  Content Version: 11.6  © 3622-4529 Etix. Care instructions adapted under license by [facility] ([facility]). If you have questions about a medical condition or this instruction, always ask your healthcare professional. [address] any warranty or liability for your use of this information. Patient Education        Sinusitis: Care Instructions  Your Care Instructions    Sinusitis is an infection of the lining of the sinus cavities in your head. Sinusitis often follows a cold. It causes pain and pressure in your head and face. In most cases, sinusitis gets better on its own in 1 to 2 weeks. But some mild symptoms may last for several weeks. Sometimes antibiotics are needed. Follow-up care is a key part of your treatment and safety. Be sure to make and go to all appointments, and call your doctor if you are having problems. It's also a good idea to know your test results and keep a list of the medicines you take. How can you care for yourself at home? · Take an over-the-counter pain medicine, such as acetaminophen (Tylenol), ibuprofen (Advil, Motrin), or naproxen (Aleve). Read and follow all instructions on the label. · If the doctor prescribed antibiotics, take them as directed. Do not stop taking them just because you feel better. You need to take the full course of antibiotics. · Be careful when taking over-the-counter cold or flu medicines and Tylenol at the same time. Many of these medicines have acetaminophen, which is Tylenol. Read the labels to make sure that you are not taking more than the recommended dose. Too much acetaminophen (Tylenol) can be harmful. · Breathe warm, moist air from a steamy shower, a hot bath, or a sink filled with hot water. Avoid cold, dry air.  Using a vaporizer or humidifier to add moisture to your bedroom. Follow the directions for cleaning the machine. When should you call for help? Call your doctor now or seek immediate medical care if:    · You have new or worse trouble swallowing.     · Your sore throat gets much worse on one side.    Watch closely for changes in your health, and be sure to contact your doctor if you do not get better as expected. Where can you learn more? Go to https://chpesaran.Kngine. org and sign in to your GlobalLogic account. Enter I330 in the Connectloud box to learn more about \"Sore Throat: Care Instructions. \"     If you do not have an account, please click on the \"Sign Up Now\" link. Current as of: May 12, 2017  Content Version: 11.6  © 1087-9144 SnowBall. Care instructions adapted under license by [facility] [address] If you have questions about a medical condition or this instruction, always ask your healthcare professional. [place] [address] any warranty or liability for your use of this information. Patient Education        Stopping Smoking: Care Instructions  Your Care Instructions  Cigarette smokers crave the nicotine in cigarettes. Giving it up is much harder than simply changing a habit. Your body has to stop craving the nicotine. It is hard to quit, but you can do it. There are many tools that people use to quit smoking. You may find that combining tools works best for you. There are several steps to quitting. First you get ready to quit. Then you get support to help you. After that, you learn new skills and behaviors to become a nonsmoker. For many people, a necessary step is getting and using medicine. Your doctor will help you set up the plan that best meets your needs. You may want to attend a smoking cessation program to help you quit smoking. When you choose a program, look for one that has proven success. Ask your doctor for ideas.  You will greatly increase your use of this information. 1.  STOP taking decongestants and Ibuprofen. These can raise your blood pressure. 2.  Try to stop smoking  3. Push water  4. Take Tylenol for pain/fever. 5.  Follow up at [facility] for your blood pressure.         Electronically signed by TRACY Savage NP on 8/6/2018 at 4:07 PM

## 2018-08-06 NOTE — PATIENT INSTRUCTIONS
and go to all appointments, and call your doctor if you are having problems. It's also a good idea to know your test results and keep a list of the medicines you take. How can you care for yourself at home? · Take an over-the-counter pain medicine, such as acetaminophen (Tylenol), ibuprofen (Advil, Motrin), or naproxen (Aleve). Read and follow all instructions on the label. · If the doctor prescribed antibiotics, take them as directed. Do not stop taking them just because you feel better. You need to take the full course of antibiotics. · Be careful when taking over-the-counter cold or flu medicines and Tylenol at the same time. Many of these medicines have acetaminophen, which is Tylenol. Read the labels to make sure that you are not taking more than the recommended dose. Too much acetaminophen (Tylenol) can be harmful. · Breathe warm, moist air from a steamy shower, a hot bath, or a sink filled with hot water. Avoid cold, dry air. Using a humidifier in your home may help. Follow the directions for cleaning the machine. · Use saline (saltwater) nasal washes to help keep your nasal passages open and wash out mucus and bacteria. You can buy saline nose drops at a grocery store or drugstore. Or you can make your own at home by adding 1 teaspoon of salt and 1 teaspoon of baking soda to 2 cups of distilled water. If you make your own, fill a bulb syringe with the solution, insert the tip into your nostril, and squeeze gently. [place]f [place] your nose. · Put a hot, wet towel or a warm gel pack on your face 3 or 4 times a day for 5 to 10 minutes each time. · Try a decongestant nasal spray like oxymetazoline (Afrin). Do not use it for more than 3 days in a row. Using it for more than 3 days can make your congestion worse. When should you call for help?   Call your doctor now or seek immediate medical care if:    · You have new or worse swelling or redness in your face or around your eyes.     · You have a new or higher fever.    Watch closely for changes in your health, and be sure to contact your doctor if:    · You have new or worse facial pain.     · The mucus from your nose becomes thicker (like pus) or has new blood in it.     · You are not getting better as expected. Where can you learn more? Go to https://chpepiceweb.1RP Media. org and sign in to your Tactiga account. Enter C970 in the [employer] box to learn more about \"Sinusitis: Care Instructions. \"     If you do not have an account, please click on the \"Sign Up Now\" link. Current as of: May 12, 2017  Content Version: 11.6  © 0691-3577 [employer]. Care instructions adapted under license by [facility] ([facility]). If you have questions about a medical condition or this instruction, always ask your healthcare professional. [place] [address] any warranty or liability for your use of this information. Patient Education        Sore Throat: Care Instructions  Your Care Instructions    Infection by bacteria or a virus causes most sore throats. Cigarette smoke, dry air, air pollution, allergies, and yelling can also cause a sore throat. Sore throats can be painful and annoying. Fortunately, most sore throats go away on their own. If you have a bacterial infection, your doctor may prescribe antibiotics. Follow-up care is a key part of your treatment and safety. Be sure to make and go to all appointments, and call your doctor if you are having problems. It's also a good idea to know your test results and keep a list of the medicines you take. How can you care for yourself at home? · If your doctor prescribed antibiotics, take them as directed. Do not stop taking them just because you feel better. You need to take the full course of antibiotics. · Gargle with warm salt water once an hour to help reduce swelling and relieve discomfort. Use 1 teaspoon of salt mixed in 1 cup of warm water.   · Take an over-the-counter pain medicine, such as acetaminophen (Tylenol), ibuprofen (Advil, Motrin), or naproxen (Aleve). Read and follow all instructions on the label. · Be careful when taking over-the-counter cold or flu medicines and Tylenol at the same time. Many of these medicines have acetaminophen, which is Tylenol. Read the labels to make sure that you are not taking more than the recommended dose. Too much acetaminophen (Tylenol) can be harmful. · Drink plenty of fluids. Fluids may help soothe an irritated throat. Hot fluids, such as tea or soup, may help decrease throat pain. · Use over-the-counter throat lozenges to soothe pain. Regular cough drops or hard candy may also help. These should not be given to young children because of the risk of choking. · Do not smoke or allow others to smoke around you. If you need help quitting, talk to your doctor about stop-smoking programs and medicines. These can increase your chances of quitting for good. · Use a vaporizer or humidifier to add moisture to your bedroom. Follow the directions for cleaning the machine. When should you call for help? Call your doctor now or seek immediate medical care if:    · You have new or worse trouble swallowing.     · Your sore throat gets much worse on one side.    Watch closely for changes in your health, and be sure to contact your doctor if you do not get better as expected. Where can you learn more? Go to https://tapvivapeeliseoeweb.Convergent Dental. org and sign in to your Egodeus account. Enter J244 in the Flo Water box to learn more about \"Sore Throat: Care Instructions. \"     If you do not have an account, please click on the \"Sign Up Now\" link. Current as of: May 12, 2017  Content Version: 11.6  © 7583-3752 P2P-Next, [employer]orporated. Care instructions adapted under license by [facility] ([facility]).  If you have questions about a medical condition or this instruction, always ask your healthcare professional. Priti Argueta disclaims any warranty or will help your body move past its craving for nicotine. · Be prepared to keep trying. Most people are not successful the first few times they try to quit. Do not get mad at yourself if you smoke again. Make a list of things you learned and think about when you want to try again, such as next week, next month, or next year. Where can you learn more? Go to https://MapMyIndiapeNuenz.LIQUITY. org and sign in to your Carmenta Bioscience account. Enter V775 in the [facility] box to learn more about \"Stopping Smoking: Care Instructions. \"     If you do not have an account, please click on the \"Sign Up Now\" link. Current as of: November 29, 2017  Content Version: 11.6  © 3751-3709 SMS GupShup, Incorporated. Care instructions adapted under license by [facility] ([facility]). If you have questions about a medical condition or this instruction, always ask your healthcare professional. [place] [address] any warranty or liability for your use of this information. 1.  STOP taking decongestants and Ibuprofen. These can raise your blood pressure. 2.  Try to stop smoking  3. Push water  4. Take Tylenol for pain/fever. 5.  Follow up at [facility] for your blood pressure.

## 2018-08-30 ENCOUNTER — OFFICE VISIT (OUTPATIENT)
Dept: RETAIL CLINIC | Facility: CLINIC | Age: 48
End: 2018-08-30

## 2018-08-30 VITALS — DIASTOLIC BLOOD PRESSURE: 84 MMHG | TEMPERATURE: 97.6 F | HEART RATE: 76 BPM | SYSTOLIC BLOOD PRESSURE: 128 MMHG

## 2018-08-30 DIAGNOSIS — H10.31 ACUTE CONJUNCTIVITIS OF RIGHT EYE, UNSPECIFIED ACUTE CONJUNCTIVITIS TYPE: Primary | ICD-10-CM

## 2018-08-30 PROCEDURE — 99213 OFFICE O/P EST LOW 20 MIN: CPT | Performed by: NURSE PRACTITIONER

## 2018-08-30 RX ORDER — POLYMYXIN B SULFATE AND TRIMETHOPRIM 1; 10000 MG/ML; [USP'U]/ML
1 SOLUTION OPHTHALMIC 4 TIMES DAILY
Qty: 10 ML | Refills: 0 | Status: SHIPPED | OUTPATIENT
Start: 2018-08-30 | End: 2018-09-06

## 2018-08-30 NOTE — PROGRESS NOTES
Subjective     CHANELLE Lugo is a 48 y.o. male who presents to the clinic with: c/o right eye discomfort and irritation. She says it felt like something was in here eye all day yesterday and then this morning she woke up with it irritated and swollen and now its tender.       Conjunctivitis    The current episode started yesterday. The onset was sudden. The problem occurs continuously. The problem has been gradually worsening. The problem is mild. Nothing relieves the symptoms. The symptoms are aggravated by movement and light. Associated symptoms include eye itching, photophobia, eye discharge, eye pain and eye redness. Pertinent negatives include no fever, no decreased vision, no double vision, no congestion, no ear pain, no headaches and no rhinorrhea. The right eye is affected. The eye pain is associated with movement. The eyelid exhibits redness.          The following portions of the patient's history were reviewed and updated as appropriate: allergies, current medications, past family history, past medical history, past social history, past surgical history and problem list.      Review of Systems   Constitutional: Negative for fever.   HENT: Negative for congestion, ear pain, facial swelling and rhinorrhea.    Eyes: Positive for photophobia, pain, discharge, redness and itching. Negative for double vision.   Gastrointestinal: Negative.    Musculoskeletal: Negative.    Skin: Negative.    Neurological: Negative for dizziness and headaches.         Objective   Physical Exam   Constitutional: She is oriented to person, place, and time. She appears well-developed and well-nourished. No distress.   HENT:   Head: Normocephalic.   Nose: Nose normal.   Mouth/Throat: Oropharynx is clear and moist.   Eyes: Pupils are equal, round, and reactive to light. EOM and lids are normal. Right eye exhibits no discharge, no exudate and no hordeolum. Right conjunctiva is injected. Right conjunctiva has no hemorrhage. Left  conjunctiva is not injected.   Mild swelling noted on the inner corner of the bottom right lid    Cardiovascular: Normal rate and regular rhythm.    Pulmonary/Chest: Effort normal and breath sounds normal.   Neurological: She is alert and oriented to person, place, and time.   Skin: Skin is warm and dry.   Psychiatric: She has a normal mood and affect. Her behavior is normal.   Vitals reviewed.        Assessment/Plan   CHANELLE was seen today for conjunctivitis.    Diagnoses and all orders for this visit:    Acute conjunctivitis of right eye, unspecified acute conjunctivitis type    Other orders  -     trimethoprim-polymyxin b (POLYTRIM) 57733-9.1 UNIT/ML-% ophthalmic solution; Administer 1 drop to the right eye 4 (Four) Times a Day for 7 days.    Use eye drops as prescribed and follow treatment plan as discussed. Follow up with an eye doctor if symptoms are no better in 48-72 or if worse at anytime. Patient verbalized understanding.

## 2018-08-30 NOTE — PATIENT INSTRUCTIONS
Bacterial Conjunctivitis  Bacterial conjunctivitis is an infection of your conjunctiva. This is the clear membrane that covers the white part of your eye and the inner surface of your eyelid. This condition can make your eye:  · Red or pink.  · Itchy.    This condition is caused by bacteria. This condition spreads very easily from person to person (is contagious) and from one eye to the other eye.  Follow these instructions at home:  Medicines  · Take or apply your antibiotic medicine as told by your doctor. Do not stop taking or applying the antibiotic even if you start to feel better.  · Take or apply over-the-counter and prescription medicines only as told by your doctor.  · Do not touch your eyelid with the eye drop bottle or the ointment tube.  Managing discomfort  · Wipe any fluid from your eye with a warm, wet washcloth or a cotton ball.  · Place a cool, clean washcloth on your eye. Do this for 10-20 minutes, 3-4 times per day.  General instructions  · Do not wear contact lenses until the irritation is gone. Wear glasses until your doctor says it is okay to wear contacts.  · Do not wear eye makeup until your symptoms are gone. Throw away any old makeup.  · Change or wash your pillowcase every day.  · Do not share towels or washcloths with anyone.  · Wash your hands often with soap and water. Use paper towels to dry your hands.  · Do not touch or rub your eyes.  · Do not drive or use heavy machinery if your vision is blurry.  Contact a doctor if:  · You have a fever.  · Your symptoms do not get better after 10 days.  Get help right away if:  · You have a fever and your symptoms suddenly get worse.  · You have very bad pain when you move your eye.  · Your face:  ? Hurts.  ? Is red.  ? Is swollen.  · You have sudden loss of vision.  This information is not intended to replace advice given to you by your health care provider. Make sure you discuss any questions you have with your health care provider.  Document  Released: 09/26/2009 Document Revised: 05/25/2017 Document Reviewed: 09/29/2016  Elsevier Interactive Patient Education © 2018 Elsevier Inc.

## 2018-10-31 ENCOUNTER — OFFICE VISIT (OUTPATIENT)
Dept: RETAIL CLINIC | Facility: CLINIC | Age: 48
End: 2018-10-31

## 2018-10-31 VITALS
SYSTOLIC BLOOD PRESSURE: 116 MMHG | HEART RATE: 91 BPM | DIASTOLIC BLOOD PRESSURE: 80 MMHG | OXYGEN SATURATION: 98 % | TEMPERATURE: 97.5 F

## 2018-10-31 DIAGNOSIS — H60.501 ACUTE OTITIS EXTERNA OF RIGHT EAR, UNSPECIFIED TYPE: Primary | ICD-10-CM

## 2018-10-31 PROCEDURE — 99213 OFFICE O/P EST LOW 20 MIN: CPT | Performed by: NURSE PRACTITIONER

## 2018-10-31 PROCEDURE — 69209 REMOVE IMPACTED EAR WAX UNI: CPT | Performed by: NURSE PRACTITIONER

## 2018-10-31 NOTE — PROGRESS NOTES
Procedure   Ear Cerumen Removal Instrumentation  Date/Time: 10/31/2018 5:21 PM  Performed by: BRENDA MAY  Authorized by: BRENDA MAY   Consent: Verbal consent obtained.  Risks and benefits: risks, benefits and alternatives were discussed  Consent given by: patient  Patient understanding: patient states understanding of the procedure being performed  Location details: right ear  Patient tolerance: Patient tolerated the procedure well with no immediate complications  Procedure type: irrigation

## 2018-10-31 NOTE — PROGRESS NOTES
"Subjective   Ayanna Lugo is a 48 y.o. female.     About 3 days ago patient states she took a shower and felt like maybe the water didn't come all the way out.  Then is felt like it \"closed up.\"  She denies any drainage from the ear but does report soreness to and around the ear.        Earache    There is pain in the right ear. This is a new problem. The current episode started in the past 7 days. The problem has been gradually worsening. There has been no fever. Pertinent negatives include no coughing, diarrhea, ear discharge, rhinorrhea or vomiting. Treatments tried: Ibuprofen. The treatment provided moderate relief.        The following portions of the patient's history were reviewed and updated as appropriate: allergies, current medications, past family history, past medical history, past social history, past surgical history and problem list.    Review of Systems   Constitutional: Negative for fever.   HENT: Positive for congestion (\"maybe a little\") and ear pain. Negative for ear discharge and rhinorrhea.    Eyes: Negative for discharge.   Respiratory: Negative for cough.    Gastrointestinal: Negative for diarrhea, nausea and vomiting.   Allergic/Immunologic: Negative for environmental allergies.   Neurological: Negative for headache.       Objective   Physical Exam   Constitutional: She appears well-developed and well-nourished. She does not appear ill. No distress.   HENT:   Right Ear: External ear normal. There is swelling and tenderness. No drainage (minimal white exudate noted to canal floor). Tympanic membrane is not erythematous (Pink). Impacted cerumen: Ball of cerumen against TM.  Left Ear: External ear normal. No drainage, swelling or tenderness. Tympanic membrane is not erythematous.   Mouth/Throat: Oropharynx is clear and moist. No oropharyngeal exudate or posterior oropharyngeal erythema.   Neck: Neck supple.   Cardiovascular: Normal rate, regular rhythm and normal heart sounds.  Exam " reveals no gallop and no friction rub.    No murmur heard.  Pulmonary/Chest: Effort normal and breath sounds normal. No respiratory distress. She has no wheezes. She has no rales.   Lymphadenopathy:        Head (right side): No preauricular and no posterior auricular adenopathy present.        Head (left side): No preauricular and no posterior auricular adenopathy present.     She has no cervical adenopathy.   Neurological: She is alert.   Skin: Skin is warm and dry. She is not diaphoretic.   Psychiatric: She has a normal mood and affect. Her behavior is normal.         Assessment/Plan   Ayanna was seen today for earache.    Diagnoses and all orders for this visit:    Acute otitis externa of right ear, unspecified type  -     neomycin-polymyxin-hydrocortisone (CORTISPORIN) [id number] otic solution; Administer 3 drops to the right ear 4 (Four) Times a Day for 7 days.    Cerumen deep in canal and unable to get good full look at TM/Canal.  Cerumen removed via irrigation with little effort    Avoid getting water in the ear  If there is no improvement over next 2-3 days or symptoms worsen (fever, swollen lymph nodes, more canal swelling, etc) return for recheck

## 2019-01-05 ENCOUNTER — OFFICE VISIT (OUTPATIENT)
Dept: URGENT CARE | Age: 49
End: 2019-01-05
Payer: MEDICAID

## 2019-01-05 VITALS
WEIGHT: 192.4 LBS | BODY MASS INDEX: 30.2 KG/M2 | RESPIRATION RATE: 20 BRPM | SYSTOLIC BLOOD PRESSURE: 134 MMHG | OXYGEN SATURATION: 98 % | DIASTOLIC BLOOD PRESSURE: 78 MMHG | HEART RATE: 89 BPM | TEMPERATURE: 97.8 F | HEIGHT: 67 IN

## 2019-01-05 DIAGNOSIS — R05.8 COUGH PRODUCTIVE OF YELLOW SPUTUM: ICD-10-CM

## 2019-01-05 DIAGNOSIS — R52 BODY ACHES: ICD-10-CM

## 2019-01-05 DIAGNOSIS — F17.200 SMOKER: ICD-10-CM

## 2019-01-05 DIAGNOSIS — J06.9 UPPER RESPIRATORY TRACT INFECTION, UNSPECIFIED TYPE: Primary | ICD-10-CM

## 2019-01-05 LAB
INFLUENZA A ANTIBODY: NEGATIVE
INFLUENZA B ANTIBODY: NEGATIVE
S PYO AG THROAT QL: NORMAL

## 2019-01-05 PROCEDURE — 87880 STREP A ASSAY W/OPTIC: CPT | Performed by: SPECIALIST

## 2019-01-05 PROCEDURE — 99213 OFFICE O/P EST LOW 20 MIN: CPT | Performed by: SPECIALIST

## 2019-01-05 PROCEDURE — 87804 INFLUENZA ASSAY W/OPTIC: CPT | Performed by: SPECIALIST

## 2019-01-05 RX ORDER — FLUTICASONE PROPIONATE 50 MCG
2 SPRAY, SUSPENSION (ML) NASAL DAILY
Qty: 1 BOTTLE | Refills: 0 | Status: SHIPPED | OUTPATIENT
Start: 2019-01-05

## 2019-01-05 RX ORDER — BENZONATATE 200 MG/1
200 CAPSULE ORAL 2 TIMES DAILY PRN
Qty: 20 CAPSULE | Refills: 0 | Status: SHIPPED | OUTPATIENT
Start: 2019-01-05 | End: 2019-01-15

## 2019-01-05 RX ORDER — DEXAMETHASONE SODIUM PHOSPHATE 10 MG/ML
10 INJECTION INTRAMUSCULAR; INTRAVENOUS ONCE
Status: COMPLETED | OUTPATIENT
Start: 2019-01-05 | End: 2019-01-05

## 2019-01-05 RX ORDER — METHYLPREDNISOLONE 4 MG/1
TABLET ORAL
Qty: 1 KIT | Refills: 0 | Status: SHIPPED | OUTPATIENT
Start: 2019-01-05 | End: 2019-06-09 | Stop reason: SDUPTHER

## 2019-01-05 RX ORDER — CLARITHROMYCIN 500 MG/1
500 TABLET, COATED ORAL 2 TIMES DAILY
Qty: 20 TABLET | Refills: 0 | Status: SHIPPED | OUTPATIENT
Start: 2019-01-05 | End: 2019-01-15

## 2019-01-05 RX ORDER — IBUPROFEN 600 MG/1
600 TABLET ORAL EVERY 6 HOURS PRN
COMMUNITY

## 2019-01-05 RX ADMIN — DEXAMETHASONE SODIUM PHOSPHATE 10 MG: 10 INJECTION INTRAMUSCULAR; INTRAVENOUS at 15:39

## 2019-01-05 ASSESSMENT — ENCOUNTER SYMPTOMS
COUGH: 1
SORE THROAT: 1

## 2019-02-19 ENCOUNTER — OFFICE VISIT (OUTPATIENT)
Dept: RETAIL CLINIC | Facility: CLINIC | Age: 49
End: 2019-02-19

## 2019-02-19 VITALS
TEMPERATURE: 98 F | WEIGHT: 189 LBS | RESPIRATION RATE: 16 BRPM | SYSTOLIC BLOOD PRESSURE: 138 MMHG | DIASTOLIC BLOOD PRESSURE: 98 MMHG | HEART RATE: 86 BPM | OXYGEN SATURATION: 98 %

## 2019-02-19 DIAGNOSIS — B34.9 VIRAL ILLNESS: Primary | ICD-10-CM

## 2019-02-19 LAB
EXPIRATION DATE: NORMAL
EXPIRATION DATE: NORMAL
FLUAV AG NPH QL: NEGATIVE
FLUBV AG NPH QL: NEGATIVE
INTERNAL CONTROL: NORMAL
INTERNAL CONTROL: NORMAL
Lab: NORMAL
Lab: NORMAL
S PYO AG THROAT QL: NEGATIVE

## 2019-02-19 PROCEDURE — 99213 OFFICE O/P EST LOW 20 MIN: CPT | Performed by: NURSE PRACTITIONER

## 2019-02-19 PROCEDURE — 87880 STREP A ASSAY W/OPTIC: CPT | Performed by: NURSE PRACTITIONER

## 2019-02-19 PROCEDURE — 87804 INFLUENZA ASSAY W/OPTIC: CPT | Performed by: NURSE PRACTITIONER

## 2019-02-19 RX ORDER — ONDANSETRON HYDROCHLORIDE 8 MG/1
8 TABLET, FILM COATED ORAL EVERY 8 HOURS PRN
Qty: 15 TABLET | Refills: 0 | Status: SHIPPED | OUTPATIENT
Start: 2019-02-19 | End: 2019-11-04

## 2019-02-19 NOTE — PATIENT INSTRUCTIONS
Viral Illness, Adult  Viruses are tiny germs that can get into a person's body and cause illness. There are many different types of viruses, and they cause many types of illness. Viral illnesses can range from mild to severe. They can affect various parts of the body.  Common illnesses that are caused by a virus include colds and the flu. Viral illnesses also include serious conditions such as HIV/AIDS (human immunodeficiency virus/acquired immunodeficiency syndrome). A few viruses have been linked to certain cancers.  What are the causes?  Many types of viruses can cause illness. Viruses invade cells in your body, multiply, and cause the infected cells to malfunction or die. When the cell dies, it releases more of the virus. When this happens, you develop symptoms of the illness, and the virus continues to spread to other cells. If the virus takes over the function of the cell, it can cause the cell to divide and grow out of control, as is the case when a virus causes cancer.  Different viruses get into the body in different ways. You can get a virus by:  · Swallowing food or water that is contaminated with the virus.  · Breathing in droplets that have been coughed or sneezed into the air by an infected person.  · Touching a surface that has been contaminated with the virus and then touching your eyes, nose, or mouth.  · Being bitten by an insect or animal that carries the virus.  · Having sexual contact with a person who is infected with the virus.  · Being exposed to blood or fluids that contain the virus, either through an open cut or during a transfusion.    If a virus enters your body, your body's defense system (immune system) will try to fight the virus. You may be at higher risk for a viral illness if your immune system is weak.  What are the signs or symptoms?  Symptoms vary depending on the type of virus and the location of the cells that it invades. Common symptoms of the main types of viral illnesses  include:  Cold and flu viruses  · Fever.  · Headache.  · Sore throat.  · Muscle aches.  · Nasal congestion.  · Cough.  Digestive system (gastrointestinal) viruses  · Fever.  · Abdominal pain.  · Nausea.  · Diarrhea.  Liver viruses (hepatitis)  · Loss of appetite.  · Tiredness.  · Yellowing of the skin (jaundice).  Brain and spinal cord viruses  · Fever.  · Headache.  · Stiff neck.  · Nausea and vomiting.  · Confusion or sleepiness.  Skin viruses  · Warts.  · Itching.  · Rash.  Sexually transmitted viruses  · Discharge.  · Swelling.  · Redness.  · Rash.  How is this treated?  Viruses can be difficult to treat because they live within cells. Antibiotic medicines do not treat viruses because these drugs do not get inside cells. Treatment for a viral illness may include:  · Resting and drinking plenty of fluids.  · Medicines to relieve symptoms. These can include over-the-counter medicine for pain and fever, medicines for cough or congestion, and medicines to relieve diarrhea.  · Antiviral medicines. These drugs are available only for certain types of viruses. They may help reduce flu symptoms if taken early. There are also many antiviral medicines for hepatitis and HIV/AIDS.    Some viral illnesses can be prevented with vaccinations. A common example is the flu shot.  Follow these instructions at home:  Medicines    · Take over-the-counter and prescription medicines only as told by your health care provider.  · If you were prescribed an antiviral medicine, take it as told by your health care provider. Do not stop taking the medicine even if you start to feel better.  · Be aware of when antibiotics are needed and when they are not needed. Antibiotics do not treat viruses. If your health care provider thinks that you may have a bacterial infection as well as a viral infection, you may get an antibiotic.  ? Do not ask for an antibiotic prescription if you have been diagnosed with a viral illness. That will not make your  illness go away faster.  ? Frequently taking antibiotics when they are not needed can lead to antibiotic resistance. When this develops, the medicine no longer works against the bacteria that it normally fights.  General instructions  · Drink enough fluids to keep your urine clear or pale yellow.  · Rest as much as possible.  · Return to your normal activities as told by your health care provider. Ask your health care provider what activities are safe for you.  · Keep all follow-up visits as told by your health care provider. This is important.  How is this prevented?  Take these actions to reduce your risk of viral infection:  · Eat a healthy diet and get enough rest.  · Wash your hands often with soap and water. This is especially important when you are in public places. If soap and water are not available, use hand .  · Avoid close contact with friends and family who have a viral illness.  · If you travel to areas where viral gastrointestinal infection is common, avoid drinking water or eating raw food.  · Keep your immunizations up to date. Get a flu shot every year as told by your health care provider.  · Do not share toothbrushes, nail clippers, razors, or needles with other people.  · Always practice safe sex.    Contact a health care provider if:  · You have symptoms of a viral illness that do not go away.  · Your symptoms come back after going away.  · Your symptoms get worse.  Get help right away if:  · You have trouble breathing.  · You have a severe headache or a stiff neck.  · You have severe vomiting or abdominal pain.  This information is not intended to replace advice given to you by your health care provider. Make sure you discuss any questions you have with your health care provider.  Document Released: 04/28/2017 Document Revised: 05/31/2017 Document Reviewed: 04/28/2017  [employer] Interactive Patient Education © 2018 [employer]    You have been diagnosed with gastroenteritis.  Hydrate  with clear, decaffeinated fluids every 1-2 hours.  Stay off solid foods x 24 hours, then advance diet as tolerated.  If you have been given Phenergan tablets for nausea/vomiting, take the medication only while at home and don't drink or drive while taking the medication.  Good handwashing and sanitizing bathrooms and solid surfaces is recommended.  Linens and clothing should be washed in hot water.  If symptoms don't improve in 48-72 hours, follow-up with your primary care provider.  If symptoms of dehydration occur (dry mouth, lightheaded/dizzy, fatigued, thirsty, dark-colored urine, less urine than normal) or severe abdominal pain, you should go to the ER.

## 2019-02-19 NOTE — PROGRESS NOTES
Chief Complaint   Patient presents with   • Diarrhea     Subjective   Ayanna Lugo is a 48 y.o. female who presents to the clinic today.  Diarrhea    This is a new problem. Episode onset: 2 days ago  Episode frequency: none today, several times sunday and monday  The problem has been gradually improving. The patient states that diarrhea does not awaken her from sleep. Associated symptoms include chills, coughing (mild ), headaches, myalgias and vomiting (none today, worse sunday and monday ). Pertinent negatives include no abdominal pain, arthralgias, bloating, fever, increased  flatus, sweats, URI or weight loss. Nothing aggravates the symptoms. There are no known risk factors. She has tried nothing for the symptoms. There is no history of bowel resection, inflammatory bowel disease, irritable bowel syndrome, malabsorption, a recent abdominal surgery or short gut syndrome.         Current Outpatient Medications:   •  Multiple Vitamins-Minerals (MULTIVITAMIN ADULT PO), Take  by mouth., Disp: , Rfl:       Allergies:  Patient has no known allergies.    History reviewed. No pertinent past medical history.  Past Surgical History:   Procedure Laterality Date   • EXPLORATORY LAPAROTOMY     • TONSILLECTOMY       Family History   Problem Relation Age of Onset   • No Known Problems Mother    • No Known Problems Father      Social History     Tobacco Use   • Smoking status: Current Every Day Smoker     Packs/day: 1.00     Types: Cigarettes   • Smokeless tobacco: Never Used   Substance Use Topics   • Alcohol use: No   • Drug use: No       Review of Systems  Review of Systems   Constitutional: Positive for appetite change (decreased, but states she is drinking plenty of fluids ), chills and fatigue. Negative for activity change, diaphoresis, fever, unexpected weight change and weight loss.   HENT: Positive for congestion (improved today ), rhinorrhea and sore throat (mild, pt states she thinks it is from the vomiting  ). Negative for dental problem, drooling, ear discharge, ear pain, facial swelling, hearing loss, mouth sores, nosebleeds, postnasal drip, sinus pressure, sinus pain, sneezing, tinnitus, trouble swallowing and voice change.    Respiratory: Positive for cough (mild ). Negative for apnea, choking, chest tightness, shortness of breath, wheezing and stridor.    Cardiovascular: Negative for chest pain, palpitations and leg swelling.   Gastrointestinal: Positive for diarrhea, nausea and vomiting (none today, worse sunday and monday ). Negative for abdominal distention, abdominal pain, anal bleeding, bloating, blood in stool, constipation, flatus and rectal pain.   Genitourinary: Negative for dysuria.   Musculoskeletal: Positive for myalgias. Negative for arthralgias, neck pain and neck stiffness.   Skin: Negative for color change, pallor and rash.   Neurological: Positive for headaches. Negative for dizziness, tremors, seizures, syncope, facial asymmetry, speech difficulty, weakness, light-headedness and numbness.   Hematological: Negative for adenopathy.       Objective   /98   Pulse 86   Temp 98 °F (36.7 °C) (Oral)   Resp 16   Wt 85.7 kg (189 lb)   LMP 02/05/2019 (Approximate)   SpO2 98%   Breastfeeding? No       Physical Exam   Constitutional: She is oriented to person, place, and time. She appears well-developed and well-nourished. She is active.  Non-toxic appearance. She does not have a sickly appearance. She does not appear ill. No distress.   HENT:   Head: Normocephalic and atraumatic.   Right Ear: Hearing, tympanic membrane, external ear and ear canal normal.   Left Ear: Hearing, tympanic membrane, external ear and ear canal normal.   Nose: Rhinorrhea present. No mucosal edema, nose lacerations, sinus tenderness, nasal deformity, septal deviation or nasal septal hematoma. No epistaxis.  No foreign bodies. Right sinus exhibits frontal sinus tenderness (mild ). Right sinus exhibits no maxillary sinus  tenderness. Left sinus exhibits frontal sinus tenderness (mild ). Left sinus exhibits no maxillary sinus tenderness.   Mouth/Throat: Uvula is midline and mucous membranes are normal. Oropharyngeal exudate (clearish white PND ) present. No posterior oropharyngeal edema, posterior oropharyngeal erythema or tonsillar abscesses. Tonsils are 0 on the right. Tonsils are 0 on the left. No tonsillar exudate.   Neck: Trachea normal, normal range of motion and phonation normal. Neck supple.   Cardiovascular: Normal rate, regular rhythm, S1 normal, S2 normal, normal heart sounds and normal pulses.   Pulses:       Radial pulses are 2+ on the right side, and 2+ on the left side.   Pulmonary/Chest: Effort normal and breath sounds normal.   Abdominal: Soft. Normal appearance. She exhibits no shifting dullness, no distension, no pulsatile liver, no fluid wave, no abdominal bruit, no ascites, no pulsatile midline mass and no mass. Bowel sounds are increased. There is no hepatosplenomegaly. There is no tenderness. There is no rigidity, no rebound, no guarding, no CVA tenderness, no tenderness at McBurney's point and negative Angel's sign. No hernia.   Lymphadenopathy:        Head (right side): No submental, no submandibular, no tonsillar, no preauricular, no posterior auricular and no occipital adenopathy present.        Head (left side): No submental, no submandibular, no tonsillar, no preauricular, no posterior auricular and no occipital adenopathy present.     She has no cervical adenopathy.        Right: No supraclavicular adenopathy present.        Left: No supraclavicular adenopathy present.   Neurological: She is alert and oriented to person, place, and time.   Skin: Skin is warm and dry.   Vitals reviewed.      Assessment/Plan     Ayanna was seen today for diarrhea.    Diagnoses and all orders for this visit:    Viral illness  -     POCT Influenza A/B  -     POCT rapid strep A    Other orders  -     ondansetron (ZOFRAN) 8 MG  tablet; Take 1 tablet by mouth Every 8 (Eight) Hours As Needed for Nausea or Vomiting.      Lab Results   Component Value Date    RAPFLUA Negative 02/19/2019    RAPFLUB Negative 02/19/2019     Lab Results   Component Value Date    RAPSCRN Negative 02/19/2019     You have been diagnosed with gastroenteritis/viral illness.  Hydrate with clear, decaffeinated fluids every 1-2 hours.  Stay off solid foods x 24 hours, then advance diet as tolerated.  Take the prescribed anti-nausea medication as needed as prescribed.  Good handwashing and sanitizing bathrooms and solid surfaces is recommended.  Linens and clothing should be washed in hot water.  If symptoms don't improve in 48-72 hours, follow-up with your primary care provider.  If symptoms of dehydration occur (dry mouth, lightheaded/dizzy, fatigued, thirsty, dark-colored urine, less urine than normal) or severe abdominal pain, you should go to the ER.

## 2019-03-29 ENCOUNTER — OFFICE VISIT (OUTPATIENT)
Dept: RETAIL CLINIC | Facility: CLINIC | Age: 49
End: 2019-03-29

## 2019-03-29 VITALS
TEMPERATURE: 98.8 F | SYSTOLIC BLOOD PRESSURE: 127 MMHG | OXYGEN SATURATION: 97 % | DIASTOLIC BLOOD PRESSURE: 79 MMHG | HEART RATE: 91 BPM

## 2019-03-29 DIAGNOSIS — K04.7 INFECTED TOOTH: ICD-10-CM

## 2019-03-29 DIAGNOSIS — J01.40 ACUTE PANSINUSITIS, RECURRENCE NOT SPECIFIED: Primary | ICD-10-CM

## 2019-03-29 PROCEDURE — 99213 OFFICE O/P EST LOW 20 MIN: CPT | Performed by: NURSE PRACTITIONER

## 2019-03-29 RX ORDER — AMOXICILLIN AND CLAVULANATE POTASSIUM 875; 125 MG/1; MG/1
1 TABLET, FILM COATED ORAL 2 TIMES DAILY
Qty: 20 TABLET | Refills: 0 | Status: SHIPPED | OUTPATIENT
Start: 2019-03-29 | End: 2019-04-08

## 2019-03-29 NOTE — PATIENT INSTRUCTIONS
Call [facility] and schedule appointment asap  Warm salt water gargles  Tylenol or ibuprofen as needed for tooth pain/headache  If symptoms worsen, follow up with higher level of care.       Dental Pain  Dental pain may be caused by many things, including:  · Tooth decay (cavities or caries). Cavities cause the nerve of your tooth to be open to air and hot or cold temperatures. This can cause pain or discomfort.  · Abscess or infection. A dental abscess is an area that is full of infected pus from a bacterial infection in the inner part of the tooth (pulp). It usually happens at the end of the tooth’s root.  · Injury.  · An unknown reason (idiopathic).    Your pain may be mild or severe. It may only happen when:  · You are chewing.  · You are exposed to hot or cold temperature.  · You are eating or drinking sugary foods or beverages, such as:  ? Soda.  ? Candy.    Your pain may also be there all of the time.  Follow these instructions at home:  Watch your dental pain for any changes. Do these things to lessen your discomfort:  · Take medicines only as told by your dentist.  · If your dentist tells you to take an antibiotic medicine, finish all of it even if you start to feel better.  · Keep all follow-up visits as told by your dentist. This is important.  · Do not apply heat to the outside of your face.  · Rinse your mouth or gargle with salt water if told by your dentist. This helps with pain and swelling.  ? You can make salt water by adding ¼ tsp of salt to 1 cup of warm water.  · Apply ice to the painful area of your face:  ? Put ice in a plastic bag.  ? Place a towel between your skin and the bag.  ? Leave the ice on for 20 minutes, 2-3 times per day.  · Avoid foods or drinks that cause you pain, such as:  ? Very hot or very cold foods or drinks.  ? Sweet or sugary foods or drinks.    Contact a doctor if:  · Your pain is not helped with medicines.  · Your symptoms are worse.  · You have new symptoms.  Get  help right away if:  · You cannot open your mouth.  · You are having trouble breathing or swallowing.  · You have a fever.  · Your face, neck, or jaw is puffy (swollen).  This information is not intended to replace advice given to you by your health care provider. Make sure you discuss any questions you have with your health care provider.  Document Released: 06/05/2009 Document Revised: 05/25/2017 Document Reviewed: 12/14/2015  [employer] Interactive Patient Education © 2019 [employer]

## 2019-03-29 NOTE — PROGRESS NOTES
"Subjective   Ayanna Lugo is a 49 y.o. female.     Ayanna presents today for pain in jaw and sinus pain.  She reports a filling fell out a few days ago in an upper right tooth.  When she woke today she states she developed swelling and pain to the area of the tooth and shooting up to sinus area.  She has tried Listerine wash for symptoms.  She states she can't get into a dentist for a week but she also states she doesn't have an appointment and is trying to find a dentist.       =  The following portions of the patient's history were reviewed and updated as appropriate: allergies, current medications, past family history, past medical history, past social history, past surgical history and problem list.    Review of Systems   Constitutional: Negative for fever (Hasn't checked it).   HENT: Positive for facial swelling. Negative for congestion (Stuffy nose).    Respiratory: Negative for cough.    Gastrointestinal: Negative for diarrhea, nausea and vomiting.   Neurological: Positive for headache (\"sinus headache\").       Objective   Physical Exam   Constitutional: She appears well-developed and well-nourished. She does not appear ill. No distress.   HENT:   Right Ear: External ear normal. Tympanic membrane is not erythematous (dull).   Left Ear: External ear normal. Tympanic membrane is not erythematous (dull).   Nose: No congestion. Right sinus exhibits maxillary sinus tenderness and frontal sinus tenderness. Left sinus exhibits frontal sinus tenderness. Left sinus exhibits no maxillary sinus tenderness.   Mouth/Throat: Oropharynx is clear and moist. No oropharyngeal exudate or posterior oropharyngeal erythema.       Neck: Neck supple.   Cardiovascular: Normal rate, regular rhythm and normal heart sounds. Exam reveals no gallop and no friction rub.   No murmur heard.  Pulmonary/Chest: Effort normal and breath sounds normal. No stridor. No respiratory distress. She has no wheezes. She has no rales. "   Lymphadenopathy:     She has no cervical adenopathy.   Neurological: She is alert.   Skin: Skin is warm and dry. She is not diaphoretic.   Psychiatric: She has a normal mood and affect. Her behavior is normal.         Assessment/Plan   Ayanna was seen today for sinusitis.    Diagnoses and all orders for this visit:    Acute pansinusitis, recurrence not specified    Infected tooth    Other orders  -     amoxicillin-clavulanate (AUGMENTIN) 875-125 MG per tablet; Take 1 tablet by mouth 2 (Two) Times a Day for 10 days.

## 2019-06-09 ENCOUNTER — OFFICE VISIT (OUTPATIENT)
Dept: URGENT CARE | Age: 49
End: 2019-06-09
Payer: MEDICAID

## 2019-06-09 VITALS
HEART RATE: 83 BPM | RESPIRATION RATE: 18 BRPM | OXYGEN SATURATION: 98 % | BODY MASS INDEX: 29.91 KG/M2 | TEMPERATURE: 98.4 F | DIASTOLIC BLOOD PRESSURE: 64 MMHG | WEIGHT: 191 LBS | SYSTOLIC BLOOD PRESSURE: 112 MMHG

## 2019-06-09 DIAGNOSIS — M54.31 SCIATICA OF RIGHT SIDE: ICD-10-CM

## 2019-06-09 DIAGNOSIS — M54.5 LOW BACK PAIN, UNSPECIFIED BACK PAIN LATERALITY, UNSPECIFIED CHRONICITY, WITH SCIATICA PRESENCE UNSPECIFIED: Primary | ICD-10-CM

## 2019-06-09 LAB
APPEARANCE FLUID: ABNORMAL
BILIRUBIN, POC: NEGATIVE
BLOOD URINE, POC: NEGATIVE
CLARITY, POC: CLEAR
COLOR, POC: YELLOW
GLUCOSE URINE, POC: NEGATIVE
KETONES, POC: NEGATIVE
LEUKOCYTE EST, POC: ABNORMAL
NITRITE, POC: NEGATIVE
PH, POC: 7.5
PROTEIN, POC: NEGATIVE
SPECIFIC GRAVITY, POC: 1.01
UROBILINOGEN, POC: 0.2

## 2019-06-09 PROCEDURE — 99213 OFFICE O/P EST LOW 20 MIN: CPT | Performed by: NURSE PRACTITIONER

## 2019-06-09 PROCEDURE — 81002 URINALYSIS NONAUTO W/O SCOPE: CPT | Performed by: NURSE PRACTITIONER

## 2019-06-09 RX ORDER — TRIAMCINOLONE ACETONIDE 40 MG/ML
40 INJECTION, SUSPENSION INTRA-ARTICULAR; INTRAMUSCULAR ONCE
Status: COMPLETED | OUTPATIENT
Start: 2019-06-09 | End: 2019-06-09

## 2019-06-09 RX ORDER — CYCLOBENZAPRINE HCL 10 MG
10 TABLET ORAL NIGHTLY PRN
Qty: 10 TABLET | Refills: 0 | Status: SHIPPED | OUTPATIENT
Start: 2019-06-09 | End: 2019-06-19

## 2019-06-09 RX ORDER — METHYLPREDNISOLONE 4 MG/1
TABLET ORAL
Qty: 1 KIT | Refills: 0 | Status: SHIPPED | OUTPATIENT
Start: 2019-06-09 | End: 2019-06-15

## 2019-06-09 RX ADMIN — TRIAMCINOLONE ACETONIDE 40 MG: 40 INJECTION, SUSPENSION INTRA-ARTICULAR; INTRAMUSCULAR at 15:39

## 2019-06-09 ASSESSMENT — ENCOUNTER SYMPTOMS: BACK PAIN: 1

## 2019-06-09 NOTE — PATIENT INSTRUCTIONS
Patient Education   start medrol dose pack tomorrow for inflammation  Muscle relaxer at night   Warm moist heat  No heavy lifting  See exercises below  If not better in 48 hours see pcp     Low Back Pain: Exercises  Your Care Instructions  Here are some examples of typical rehabilitation exercises for your condition. Start each exercise slowly. Ease off the exercise if you start to have pain. Your doctor or physical therapist will tell you when you can start these exercises and which ones will work best for you. How to do the exercises  Press-up    1. Lie on your stomach, supporting your body with your forearms. 2. Press your elbows down into the floor to raise your upper back. As you do this, relax your stomach muscles and allow your back to arch without using your back muscles. As your press up, do not let your hips or pelvis come off the floor. 3. Hold for 15 to 30 seconds, then relax. 4. Repeat 2 to 4 times. Alternate arm and leg (bird dog) exercise    1. Start on the floor, on your hands and knees. 2. Tighten your belly muscles. 3. Raise one leg off the floor, and hold it straight out behind you. Be careful not to let your hip drop down, because that will twist your trunk. 4. Hold for about 6 seconds, then lower your leg and switch to the other leg. 5. Repeat 8 to 12 times on each leg. 6. Over time, work up to holding for 10 to 30 seconds each time. 7. If you feel stable and secure with your leg raised, try raising the opposite arm straight out in front of you at the same time. Knee-to-chest exercise    1. Lie on your back with your knees bent and your feet flat on the floor. 2. Bring one knee to your chest, keeping the other foot flat on the floor (or keeping the other leg straight, whichever feels better on your lower back). 3. Keep your lower back pressed to the floor. Hold for at least 15 to 30 seconds. 4. Relax, and lower the knee to the starting position.   5. Repeat with the other leg. Repeat 2 to 4 times with each leg. 6. To get more stretch, put your other leg flat on the floor while pulling your knee to your chest.    Curl-ups    1. Lie on the floor on your back with your knees bent at a 90-degree angle. Your feet should be flat on the floor, about 12 inches from your buttocks. 2. Cross your arms over your chest. If this bothers your neck, try putting your hands behind your neck (not your head), with your elbows spread apart. 3. Slowly tighten your belly muscles and raise your shoulder blades off the floor. 4. Keep your head in line with your body, and do not press your chin to your chest.  5. Hold this position for 1 or 2 seconds, then slowly lower yourself back down to the floor. 6. Repeat 8 to 12 times. Pelvic tilt exercise    1. Lie on your back with your knees bent. 2. \"Brace\" your stomach. This means to tighten your muscles by pulling in and imagining your belly button moving toward your spine. You should feel like your back is pressing to the floor and your hips and pelvis are rocking back. 3. Hold for about 6 seconds while you breathe smoothly. 4. Repeat 8 to 12 times. Heel dig bridging    1. Lie on your back with both knees bent and your ankles bent so that only your heels are digging into the floor. Your knees should be bent about 90 degrees. 2. Then push your heels into the floor, squeeze your buttocks, and lift your hips off the floor until your shoulders, hips, and knees are all in a straight line. 3. Hold for about 6 seconds as you continue to breathe normally, and then slowly lower your hips back down to the floor and rest for up to 10 seconds. 4. Do 8 to 12 repetitions. Hamstring stretch in doorway    1. Lie on your back in a doorway, with one leg through the open door. 2. Slide your leg up the wall to straighten your knee. You should feel a gentle stretch down the back of your leg. 3. Hold the stretch for at least 15 to 30 seconds.  Do not arch your back, point your toes, or bend either knee. Keep one heel touching the floor and the other heel touching the wall. 4. Repeat with your other leg. 5. Do 2 to 4 times for each leg. Hip flexor stretch    1. Kneel on the floor with one knee bent and one leg behind you. Place your forward knee over your foot. Keep your other knee touching the floor. 2. Slowly push your hips forward until you feel a stretch in the upper thigh of your rear leg. 3. Hold the stretch for at least 15 to 30 seconds. Repeat with your other leg. 4. Do 2 to 4 times on each side. Wall sit    1. Stand with your back 10 to 12 inches away from a wall. 2. Lean into the wall until your back is flat against it. 3. Slowly slide down until your knees are slightly bent, pressing your lower back into the wall. 4. Hold for about 6 seconds, then slide back up the wall. 5. Repeat 8 to 12 times. Follow-up care is a key part of your treatment and safety. Be sure to make and go to all appointments, and call your doctor if you are having problems. It's also a good idea to know your test results and keep a list of the medicines you take. Where can you learn more? Go to [email or web address] and sign in to your [employer] account. Enter G091 in the [facility] box to learn more about \"Low Back Pain: Exercises. \"     If you do not have an account, please click on the \"Sign Up Now\" link. Current as of: September 20, 2018  Content Version: 12.0  © 8099-9752 Healthwise, Incorporated. Care instructions adapted under license by [facility] ([facility]). If you have questions about a medical condition or this instruction, always ask your healthcare professional. [place] [address] any warranty or liability for your use of this information.

## 2019-06-09 NOTE — PROGRESS NOTES
[facility] PO) Take by mouth      fluticasone (FLONASE) 50 MCG/ACT nasal spray 2 sprays by Nasal route daily 1 Bottle 0     Current Facility-Administered Medications   Medication Dose Route Frequency Provider Last Rate Last Dose    triamcinolone acetonide (KENALOG-40) injection 40 mg  40 mg Intramuscular Once Zain Ring, APRN - CNP         No Known Allergies    Health Maintenance   Topic Date Due    Pneumococcal 0-64 years Vaccine (1 of 1 - PPSV23) 03/12/1975    HIV screen  03/12/1984    DTaP/Tdap/Td vaccine (1 - Tdap) 03/12/1988    Cervical cancer screen  03/12/1990    Lipid screen  03/12/2009    Diabetes screen  03/12/2009    Breast cancer screen  03/12/2019    Shingles Vaccine (1 of 2) 03/12/2019    Colon cancer screen colonoscopy  03/12/2019    Flu vaccine (Season Ended) 09/01/2019       Subjective:      Review of Systems   Constitutional: Negative. Genitourinary: Negative for difficulty urinating and dysuria. Musculoskeletal: Positive for back pain. Psychiatric/Behavioral: Negative. Objective:     Physical Exam   Constitutional: She is oriented to person, place, and time. She appears well-developed and well-nourished. HENT:   Head: Normocephalic. Cardiovascular: Normal rate, regular rhythm, normal heart sounds and intact distal pulses. Pulmonary/Chest: Effort normal and breath sounds normal.   Musculoskeletal:        Lumbar back: She exhibits decreased range of motion, tenderness and pain. She exhibits no bony tenderness, no swelling, no edema, no deformity, no laceration, no spasm and normal pulse. Back:    Neurological: She is alert and oriented to person, place, and time. Skin: Skin is warm and dry. Psychiatric: She has a normal mood and affect. Her behavior is normal. Judgment and thought content normal.   Nursing note and vitals reviewed.     /64   Pulse 83   Temp 98.4 °F (36.9 °C)   Resp 18   Wt 191 lb (86.6 kg)   SpO2 98%   BMI 29.91 kg/m² let your hips or pelvis come off the floor. 3. Hold for 15 to 30 seconds, then relax. 4. Repeat 2 to 4 times. Alternate arm and leg (bird dog) exercise    1. Start on the floor, on your hands and knees. 2. Tighten your belly muscles. 3. Raise one leg off the floor, and hold it straight out behind you. Be careful not to let your hip drop down, because that will twist your trunk. 4. Hold for about 6 seconds, then lower your leg and switch to the other leg. 5. Repeat 8 to 12 times on each leg. 6. Over time, work up to holding for 10 to 30 seconds each time. 7. If you feel stable and secure with your leg raised, try raising the opposite arm straight out in front of you at the same time. Knee-to-chest exercise    1. Lie on your back with your knees bent and your feet flat on the floor. 2. Bring one knee to your chest, keeping the other foot flat on the floor (or keeping the other leg straight, whichever feels better on your lower back). 3. Keep your lower back pressed to the floor. Hold for at least 15 to 30 seconds. 4. Relax, and lower the knee to the starting position. 5. Repeat with the other leg. Repeat 2 to 4 times with each leg. 6. To get more stretch, put your other leg flat on the floor while pulling your knee to your chest.    Curl-ups    1. Lie on the floor on your back with your knees bent at a 90-degree angle. Your feet should be flat on the floor, about 12 inches from your buttocks. 2. Cross your arms over your chest. If this bothers your neck, try putting your hands behind your neck (not your head), with your elbows spread apart. 3. Slowly tighten your belly muscles and raise your shoulder blades off the floor. 4. Keep your head in line with your body, and do not press your chin to your chest.  5. Hold this position for 1 or 2 seconds, then slowly lower yourself back down to the floor. 6. Repeat 8 to 12 times. Pelvic tilt exercise    1.  Lie on your back with your knees bent.  2. \"Brace\" your stomach. This means to tighten your muscles by pulling in and imagining your belly button moving toward your spine. You should feel like your back is pressing to the floor and your hips and pelvis are rocking back. 3. Hold for about 6 seconds while you breathe smoothly. 4. Repeat 8 to 12 times. Heel dig bridging    1. Lie on your back with both knees bent and your ankles bent so that only your heels are digging into the floor. Your knees should be bent about 90 degrees. 2. Then push your heels into the floor, squeeze your buttocks, and lift your hips off the floor until your shoulders, hips, and knees are all in a straight line. 3. Hold for about 6 seconds as you continue to breathe normally, and then slowly lower your hips back down to the floor and rest for up to 10 seconds. 4. Do 8 to 12 repetitions. Hamstring stretch in doorway    1. Lie on your back in a doorway, with one leg through the open door. 2. Slide your leg up the wall to straighten your knee. You should feel a gentle stretch down the back of your leg. 3. Hold the stretch for at least 15 to 30 seconds. Do not arch your back, point your toes, or bend either knee. Keep one heel touching the floor and the other heel touching the wall. 4. Repeat with your other leg. 5. Do 2 to 4 times for each leg. Hip flexor stretch    1. Kneel on the floor with one knee bent and one leg behind you. Place your forward knee over your foot. Keep your other knee touching the floor. 2. Slowly push your hips forward until you feel a stretch in the upper thigh of your rear leg. 3. Hold the stretch for at least 15 to 30 seconds. Repeat with your other leg. 4. Do 2 to 4 times on each side. Wall sit    1. Stand with your back 10 to 12 inches away from a wall. 2. Lean into the wall until your back is flat against it. 3. Slowly slide down until your knees are slightly bent, pressing your lower back into the wall.   4. Hold for about 6 seconds, then slide back up the wall. 5. Repeat 8 to 12 times. Follow-up care is a key part of your treatment and safety. Be sure to make and go to all appointments, and call your doctor if you are having problems. It's also a good idea to know your test results and keep a list of the medicines you take. Where can you learn more? Go to [email or web address] and sign in to your [employer] account. Enter U479 in the [employer] box to learn more about \"Low Back Pain: Exercises. \"     If you do not have an account, please click on the \"Sign Up Now\" link. Current as of: September 20, 2018  Content Version: 12.0  © 2821-5222 Healthwise, Incorporated. Care instructions adapted under license by [facility] ([facility]). If you have questions about a medical condition or this instruction, always ask your healthcare professional. [place] [address] any warranty or liability for your use of this information. Electronically signed by TRACY Loyola CNP on 6/9/2019 at 3:34 PM    EMR Dragon/transcription disclaimer:  Much of thisencounter note is electronic transcription/translation of spoken language to printed texts. The electronic translation of spoken language may be erroneous, or at times, nonsensical words or phrases may be inadvertentlytranscribed.   Although I have reviewed the note for such errors, some may still exist.

## 2019-12-11 ENCOUNTER — OFFICE VISIT (OUTPATIENT)
Dept: RETAIL CLINIC | Facility: CLINIC | Age: 49
End: 2019-12-11

## 2019-12-11 VITALS
DIASTOLIC BLOOD PRESSURE: 86 MMHG | TEMPERATURE: 100 F | SYSTOLIC BLOOD PRESSURE: 135 MMHG | HEART RATE: 87 BPM | OXYGEN SATURATION: 98 %

## 2019-12-11 DIAGNOSIS — R68.89 FLU-LIKE SYMPTOMS: Primary | ICD-10-CM

## 2019-12-11 DIAGNOSIS — R11.2 NAUSEA AND VOMITING, INTRACTABILITY OF VOMITING NOT SPECIFIED, UNSPECIFIED VOMITING TYPE: ICD-10-CM

## 2019-12-11 DIAGNOSIS — N30.01 ACUTE CYSTITIS WITH HEMATURIA: ICD-10-CM

## 2019-12-11 DIAGNOSIS — J01.40 ACUTE PANSINUSITIS, RECURRENCE NOT SPECIFIED: ICD-10-CM

## 2019-12-11 LAB
BILIRUB BLD-MCNC: ABNORMAL MG/DL
CLARITY, POC: CLEAR
COLOR UR: YELLOW
EXPIRATION DATE: NORMAL
FLUAV AG NPH QL: NEGATIVE
FLUBV AG NPH QL: NEGATIVE
GLUCOSE UR STRIP-MCNC: NEGATIVE MG/DL
INTERNAL CONTROL: NORMAL
KETONES UR QL: NEGATIVE
LEUKOCYTE EST, POC: ABNORMAL
Lab: NORMAL
NITRITE UR-MCNC: NEGATIVE MG/ML
PH UR: 6 [PH] (ref 5–8)
PROT UR STRIP-MCNC: ABNORMAL MG/DL
RBC # UR STRIP: ABNORMAL /UL
SP GR UR: 1.03 (ref 1–1.03)
UROBILINOGEN UR QL: NORMAL

## 2019-12-11 PROCEDURE — 87804 INFLUENZA ASSAY W/OPTIC: CPT | Performed by: NURSE PRACTITIONER

## 2019-12-11 PROCEDURE — 99213 OFFICE O/P EST LOW 20 MIN: CPT | Performed by: NURSE PRACTITIONER

## 2019-12-11 RX ORDER — ONDANSETRON 4 MG/1
4 TABLET, ORALLY DISINTEGRATING ORAL EVERY 8 HOURS PRN
Qty: 10 TABLET | Refills: 0 | Status: SHIPPED | OUTPATIENT
Start: 2019-12-11 | End: 2020-05-10

## 2019-12-11 RX ORDER — DOXYCYCLINE HYCLATE 100 MG
100 TABLET ORAL 2 TIMES DAILY
Qty: 14 TABLET | Refills: 0 | Status: SHIPPED | OUTPATIENT
Start: 2019-12-11 | End: 2019-12-18

## 2019-12-11 RX ORDER — DOXYCYCLINE 100 MG/1
100 CAPSULE ORAL 2 TIMES DAILY
Qty: 14 CAPSULE | Refills: 0 | Status: SHIPPED | OUTPATIENT
Start: 2019-12-11 | End: 2019-12-18

## 2019-12-12 NOTE — PROGRESS NOTES
Subjective   Ayanna Lugo is a 49 y.o. female.     Fever    This is a new problem. The current episode started today. The problem has been gradually worsening. Her temperature was unmeasured prior to arrival. Associated symptoms include congestion, coughing, diarrhea (Several times), headaches, muscle aches, nausea, urinary pain (Started after vomiting and diarrhea ) and vomiting (Several times). Pertinent negatives include no sore throat.    She reports she has had cough and congestion for     The following portions of the patient's history were reviewed and updated as appropriate: allergies, current medications, past family history, past medical history, past social history, past surgical history and problem list.    Review of Systems   Constitutional: Positive for chills and fever.   HENT: Positive for congestion. Negative for sore throat.    Respiratory: Positive for cough.    Gastrointestinal: Positive for diarrhea (Several times), nausea and vomiting (Several times).   Genitourinary: Positive for dysuria (Started after vomiting and diarrhea ).   Musculoskeletal: Positive for myalgias.   Neurological: Positive for headache.       Objective   Physical Exam   Constitutional: She appears well-developed and well-nourished. She appears ill. No distress.   HENT:   Right Ear: External ear normal. Tympanic membrane is not erythematous (pink) and not bulging.   Left Ear: External ear normal. Tympanic membrane is not erythematous (pink) and not bulging.   Nose: Congestion present. Right sinus exhibits maxillary sinus tenderness and frontal sinus tenderness. Left sinus exhibits maxillary sinus tenderness and frontal sinus tenderness.   Mouth/Throat: Oropharynx is clear and moist. No oropharyngeal exudate or posterior oropharyngeal erythema.   Neck: Neck supple.   Cardiovascular: Normal rate, regular rhythm and normal heart sounds. Exam reveals no gallop and no friction rub.   No murmur heard.  Pulmonary/Chest:  Effort normal. No stridor. No respiratory distress. She has no decreased breath sounds. She has no wheezes. She has no rhonchi. She has no rales.   Abdominal: Soft. There is no tenderness. There is no CVA tenderness.   Lymphadenopathy:     She has no cervical adenopathy.   Neurological: She is alert.   Skin: Skin is warm and dry. She is not diaphoretic.   Psychiatric: She has a normal mood and affect. Her behavior is normal. Thought content normal.         Assessment/Plan   Ayanna was seen today for flu symptoms.    Diagnoses and all orders for this visit:    Flu-like symptoms  -     POC Influenza A / B    Acute cystitis with hematuria  -     POC Urinalysis Dipstick, Automated  -     Urine Culture - Urine, Urine, Clean Catch; Future    Acute pansinusitis, recurrence not specified    Nausea and vomiting, intractability of vomiting not specified, unspecified vomiting type    Other orders  -     ondansetron ODT (ZOFRAN ODT) 4 MG disintegrating tablet; Take 1 tablet by mouth Every 8 (Eight) Hours As Needed for Nausea or Vomiting.  -     doxycycline (VIBRAMYICN) 100 MG tablet; Take 1 tablet by mouth 2 (Two) Times a Day for 7 days.    Flu negative    Increase fluid intake  May do tylenol or ibuprofen as needed for fever/body aches  Warm salt water gargles as needed for sore throat  Do not over suppress cough  If unable to keep fluids in, go to ER  If no improvement over next 2-3 days or symptoms worsen, follow up with PCP

## 2019-12-12 NOTE — PATIENT INSTRUCTIONS
Increase fluid intake  May do tylenol or ibuprofen as needed for fever/body aches  Warm salt water gargles as needed for sore throat  Do not over suppress cough  If unable to keep fluids in, go to ER  If no improvement over next 2-3 days or symptoms worsen, follow up with PCP      Vomiting, Adult  Vomiting occurs when stomach contents are thrown up and out of the mouth. Many people notice nausea before vomiting. Vomiting can make you feel weak and cause you to become dehydrated. Dehydration can make you feel tired and thirsty, cause you to have a dry mouth, and decrease how often you urinate. Older adults and people who have other diseases or a weak body defense system (immune system) are at higher risk for dehydration. It is important to treat vomiting as told by your health care provider.  Follow these instructions at home:    Eating and drinking         Follow these recommendations as told by your health care provider:  · Take an oral rehydration solution (ORS). This is a drink that is sold at pharmacies and retail stores.  · Eat bland, easy-to-digest foods in small amounts as you are able. These foods include bananas, applesauce, rice, lean meats, toast, and crackers.  · Drink clear fluids slowly and in small amounts as you are able. Clear fluids include water, ice chips, low-calorie sports drinks, and fruit juice that has water added (diluted fruit juice).  · Avoid drinking fluids that contain a lot of sugar or caffeine, such as energy drinks, sports drinks, and soda.  · Avoid alcohol.  · Avoid spicy or fatty foods.    General instructions  · Wash your hands often using soap and water. If soap and water are not available, use hand . Make sure that everyone in your household washes their hands frequently.  · Take over-the-counter and prescription medicines only as told by your health care provider.  · Rest at home while you recover.  · Watch your condition for any changes.  · Keep all follow-up visits  as told by your health care provider. This is important.  Contact a health care provider if:  · Your vomiting gets worse.  · You have new symptoms.  · You have a fever.  · You cannot drink fluids without vomiting.  · You feel light-headed or dizzy.  · You have a headache.  · You have muscle cramps.  · You have a rash.  · You have pain while urinating.  Get help right away if:  · You have pain in your chest, neck, arm, or jaw.  · You feel extremely weak or you faint.  · You have persistent vomiting.  · You have vomit that is bright red or looks like black coffee grounds.  · You have stools that are bloody or black, or stools that look like tar.  · You have a severe headache, a stiff neck, or both.  · You have severe pain, cramping, or bloating in your abdomen.  · You have trouble breathing or you are breathing very quickly.  · Your heart is beating very quickly.  · Your skin feels cold and clammy.  · You feel confused.  · You have signs of dehydration, such as:  ? Dark urine, very little urine, or no urine.  ? Cracked lips.  ? Dry mouth.  ? Sunken eyes.  ? Sleepiness.  ? Weakness.  These symptoms may represent a serious problem that is an emergency. Do not wait to see if the symptoms will go away. Get medical help right away. Call your local emergency services (911 in the [place] Do not drive yourself to the hospital.  Summary  · Vomiting occurs when stomach contents are thrown up and out of the mouth. Vomiting can cause you to become dehydrated. Older adults and people who have other diseases or a weak immune system are at higher risk for dehydration.  · It is important to treat vomiting as told by your health care provider. Follow your health care provider's instructions about eating and drinking.  · Wash your hands often using soap and water. If soap and water are not available, use hand . Make sure that everyone in your household washes their hands frequently.  · Watch your condition for any changes and  for signs of dehydration.  · Keep all follow-up visits as told by your health care provider. This is important.  This information is not intended to replace advice given to you by your health care provider. Make sure you discuss any questions you have with your health care provider.  Document Released: 01/13/2017 Document Revised: 05/28/2019 Document Reviewed: 05/28/2019  [employer] Interactive Patient Education © 2019 [employer]        Diarrhea, Adult  Diarrhea is frequent loose and watery bowel movements. Diarrhea can make you feel weak and cause you to become dehydrated. Dehydration can make you tired and thirsty, cause you to have a dry mouth, and decrease how often you urinate.  Diarrhea typically lasts 2-3 days. However, it can last longer if it is a sign of something more serious. It is important to treat your diarrhea as told by your health care provider.  Follow these instructions at home:  Eating and drinking         Follow these recommendations as told by your health care provider:  · Take an oral rehydration solution (ORS). This is an over-the-counter medicine that helps return your body to its normal balance of nutrients and water. It is found at pharmacies and retail stores.  · Drink plenty of fluids, such as water, ice chips, diluted fruit juice, and low-calorie sports drinks. You can drink milk also, if desired.  · Avoid drinking fluids that contain a lot of sugar or caffeine, such as energy drinks, sports drinks, and soda.  · Eat bland, easy-to-digest foods in small amounts as you are able. These foods include bananas, applesauce, rice, lean meats, toast, and crackers.  · Avoid alcohol.  · Avoid spicy or fatty foods.    Medicines  · Take over-the-counter and prescription medicines only as told by your health care provider.  · If you were prescribed an antibiotic medicine, take it as told by your health care provider. Do not stop using the antibiotic even if you start to feel better.  General  instructions    · Wash your hands often using soap and water. If soap and water are not available, use a hand . Others in the household should wash their hands as well. Hands should be washed:  ? After using the toilet or changing a diaper.  ? Before preparing, cooking, or serving food.  ? While caring for a sick person or while visiting someone in a hospital.  · Drink enough fluid to keep your urine pale yellow.  · Rest at home while you recover.  · Watch your condition for any changes.  · Take a warm bath to relieve any burning or pain from frequent diarrhea episodes.  · Keep all follow-up visits as told by your health care provider. This is important.  Contact a health care provider if:  · You have a fever.  · Your diarrhea gets worse.  · You have new symptoms.  · You cannot keep fluids down.  · You feel light-headed or dizzy.  · You have a headache.  · You have muscle cramps.  Get help right away if:  · You have chest pain.  · You feel extremely weak or you faint.  · You have bloody or black stools or stools that look like tar.  · You have severe pain, cramping, or bloating in your abdomen.  · You have trouble breathing or you are breathing very quickly.  · Your heart is beating very quickly.  · Your skin feels cold and clammy.  · You feel confused.  · You have signs of dehydration, such as:  ? Dark urine, very little urine, or no urine.  ? Cracked lips.  ? Dry mouth.  ? Sunken eyes.  ? Sleepiness.  ? Weakness.  Summary  · Diarrhea is frequent loose and watery bowel movements. Diarrhea can make you feel weak and cause you to become dehydrated.  · Drink enough fluids to keep your urine pale yellow.  · Make sure that you wash your hands after using the toilet. If soap and water are not available, use hand .  · Contact a health care provider if your diarrhea gets worse or you have new symptoms.  · Get help right away if you have signs of dehydration.  This information is not intended to replace  advice given to you by your health care provider. Make sure you discuss any questions you have with your health care provider.  Document Released: 12/08/2003 Document Revised: 05/24/2019 Document Reviewed: 05/24/2019  [employer] Interactive Patient Education © 2019 [employer]

## 2019-12-16 ENCOUNTER — TELEPHONE (OUTPATIENT)
Dept: RETAIL CLINIC | Facility: CLINIC | Age: 49
End: 2019-12-16

## 2019-12-16 LAB
BACTERIA UR CULT: NORMAL
BACTERIA UR CULT: NORMAL

## 2019-12-16 NOTE — TELEPHONE ENCOUNTER
Called patient and informed of urine culture results.  States she is feeling better.  She denies abdominal pain, fever, or dysuria.  She states she has a little back pain but could be from lifting the carseat.  She also reports she thinks she has been urinating more often.  She is currently on an antibiotic.  Gave 2 options: She could come give another urine specimen to resent to [facility], or since symptoms are improving, can continue antibiotic and if symptoms persist, will need to drop off another specimen.  Patient chose the second option.

## 2020-02-03 ENCOUNTER — OFFICE VISIT (OUTPATIENT)
Dept: RETAIL CLINIC | Facility: CLINIC | Age: 50
End: 2020-02-03

## 2020-02-03 VITALS
SYSTOLIC BLOOD PRESSURE: 119 MMHG | DIASTOLIC BLOOD PRESSURE: 84 MMHG | OXYGEN SATURATION: 98 % | HEART RATE: 74 BPM | TEMPERATURE: 98.4 F

## 2020-02-03 DIAGNOSIS — H60.333 ACUTE SWIMMER'S EAR OF BOTH SIDES: Primary | ICD-10-CM

## 2020-02-03 PROCEDURE — 99213 OFFICE O/P EST LOW 20 MIN: CPT | Performed by: NURSE PRACTITIONER

## 2020-02-03 RX ORDER — FLUTICASONE PROPIONATE 50 MCG
2 SPRAY, SUSPENSION (ML) NASAL DAILY
Qty: 1 BOTTLE | Refills: 0 | Status: SHIPPED | OUTPATIENT
Start: 2020-02-03 | End: 2020-03-04

## 2020-02-03 RX ORDER — CIPROFLOXACIN AND DEXAMETHASONE 3; 1 MG/ML; MG/ML
4 SUSPENSION/ DROPS AURICULAR (OTIC) 2 TIMES DAILY
Qty: 7.5 ML | Refills: 0 | Status: SHIPPED | OUTPATIENT
Start: 2020-02-03 | End: 2020-02-04 | Stop reason: CLARIF

## 2020-02-03 RX ORDER — CETIRIZINE HYDROCHLORIDE 10 MG/1
10 TABLET ORAL DAILY
COMMUNITY
End: 2020-11-05

## 2020-02-03 NOTE — PROGRESS NOTES
Subjective   Ayanna Lugo is a 49 y.o. female.     She gets swimmer's ear frequently. 3 days ago she felt like her right ear wasn't draining after she took a bath. She felt like noises are muffled and she has pain around the ear. She has felt slightly dizzy. Her grandson has been sick.     Earache    There is pain in the right ear. This is a recurrent problem. The current episode started in the past 7 days. The problem occurs constantly. The problem has been unchanged. There has been no fever. Associated symptoms include headaches, hearing loss and rhinorrhea. Pertinent negatives include no abdominal pain or coughing. She has tried NSAIDs for the symptoms. The treatment provided mild relief. Her past medical history is significant for a chronic ear infection.        The following portions of the patient's history were reviewed and updated as appropriate: allergies, current medications, past family history, past medical history, past social history, past surgical history and problem list.    Review of Systems   HENT: Positive for ear pain, hearing loss and rhinorrhea.    Respiratory: Negative for cough.    Gastrointestinal: Negative for abdominal pain.       Objective   Physical Exam   HENT:   Right Ear: External ear normal. Tympanic membrane is bulging.   Left Ear: There is tenderness.   Nose: Nose normal.   Mouth/Throat: Oropharynx is clear and moist.   Left ear tender with preauricular lymphadenopathy. Left canal red with pus. Unable to visualize TM.  Right canal red. TM mildly bulging.         Assessment/Plan   Ayanna was seen today for earache.    Diagnoses and all orders for this visit:    Acute swimmer's ear of both sides    Other orders  -     ciprofloxacin-dexamethasone (CIPRODEX) 0.3-0.1 % otic suspension; Administer 4 drops into both ears 2 (Two) Times a Day for 7 days.  -     fluticasone (FLONASE) 50 MCG/ACT nasal spray; 2 sprays into the nostril(s) as directed by provider Daily for 30  days.

## 2020-02-03 NOTE — PATIENT INSTRUCTIONS
Otitis Externa    Otitis externa is an infection of the outer ear canal. The outer ear canal is the area between the outside of the ear and the eardrum. Otitis externa is sometimes called swimmer's ear.  What are the causes?  Common causes of this condition include:  · Swimming in dirty water.  · Moisture in the ear.  · An injury to the inside of the ear.  · An object stuck in the ear.  · A cut or scrape on the outside of the ear.  What increases the risk?  You are more likely to develop this condition if you go swimming often.  What are the signs or symptoms?  The first symptom of this condition is often itching in the ear. Later symptoms of the condition include:  · Swelling of the ear.  · Redness in the ear.  · Ear pain. The pain may get worse when you pull on your ear.  · Pus coming from the ear.  How is this diagnosed?  This condition may be diagnosed by examining the ear and testing fluid from the ear for bacteria and funguses.  How is this treated?  This condition may be treated with:  · Antibiotic ear drops. These are often given for 10-14 days.  · Medicines to reduce itching and swelling.  Follow these instructions at home:  · If you were prescribed antibiotic ear drops, use them as told by your health care provider. Do not stop using the antibiotic even if your condition improves.  · Take over-the-counter and prescription medicines only as told by your health care provider.  · Avoid getting water in your ears as told by your health care provider. This may include avoiding swimming or water sports for a few days.  · Keep all follow-up visits as told by your health care provider. This is important.  How is this prevented?  · Keep your ears dry. Use the corner of a towel to dry your ears after you swim or bathe.  · Avoid scratching or putting things in your ear. Doing these things can damage the ear canal or remove the protective wax that lines it, which makes it easier for bacteria and funguses to  grow.  · Avoid swimming in lakes, polluted water, or pools that may not have enough chlorine.  Contact a health care provider if:  · You have a fever.  · Your ear is still red, swollen, painful, or draining pus after 3 days.  · Your redness, swelling, or pain gets worse.  · You have a severe headache.  · You have redness, swelling, pain, or tenderness in the area behind your ear.  Summary  · Otitis externa is an infection of the outer ear canal.  · Common causes include swimming in dirty water, moisture in the ear, or a cut or scrape in the ear.  · Symptoms include pain, redness, and swelling of the ear.  · If you were prescribed antibiotic ear drops, use them as told by your health care provider. Do not stop using the antibiotic even if your condition improves.  This information is not intended to replace advice given to you by your health care provider. Make sure you discuss any questions you have with your health care provider.  Document Released: 12/18/2006 Document Revised: 05/24/2019 Document Reviewed: 05/24/2019  [employer] Interactive Patient Education © 2019 [employer]

## 2020-02-04 RX ORDER — OFLOXACIN 3 MG/ML
5 SOLUTION AURICULAR (OTIC) DAILY
Qty: 5 ML | Refills: 0 | Status: SHIPPED | OUTPATIENT
Start: 2020-02-04 | End: 2020-02-11

## 2020-03-03 ENCOUNTER — OFFICE VISIT (OUTPATIENT)
Dept: URGENT CARE | Age: 50
End: 2020-03-03
Payer: MEDICAID

## 2020-03-03 VITALS
SYSTOLIC BLOOD PRESSURE: 148 MMHG | RESPIRATION RATE: 18 BRPM | HEIGHT: 67 IN | WEIGHT: 189 LBS | DIASTOLIC BLOOD PRESSURE: 90 MMHG | HEART RATE: 93 BPM | TEMPERATURE: 97.8 F | BODY MASS INDEX: 29.66 KG/M2 | OXYGEN SATURATION: 98 %

## 2020-03-03 LAB
INFLUENZA A ANTIBODY: NEGATIVE
INFLUENZA B ANTIBODY: NEGATIVE
S PYO AG THROAT QL: NORMAL

## 2020-03-03 PROCEDURE — 87880 STREP A ASSAY W/OPTIC: CPT | Performed by: NURSE PRACTITIONER

## 2020-03-03 PROCEDURE — 99213 OFFICE O/P EST LOW 20 MIN: CPT | Performed by: NURSE PRACTITIONER

## 2020-03-03 PROCEDURE — 87804 INFLUENZA ASSAY W/OPTIC: CPT | Performed by: NURSE PRACTITIONER

## 2020-03-03 RX ORDER — AMOXICILLIN 875 MG/1
875 TABLET, COATED ORAL 2 TIMES DAILY
Qty: 20 TABLET | Refills: 0 | Status: SHIPPED | OUTPATIENT
Start: 2020-03-03 | End: 2020-03-13

## 2020-03-03 RX ORDER — PREDNISONE 10 MG/1
10 TABLET ORAL DAILY
Qty: 5 TABLET | Refills: 0 | Status: SHIPPED | OUTPATIENT
Start: 2020-03-03 | End: 2020-03-08

## 2020-03-03 ASSESSMENT — ENCOUNTER SYMPTOMS
HOARSE VOICE: 1
SORE THROAT: 1
SINUS COMPLAINT: 1
SINUS PRESSURE: 1
COUGH: 1

## 2020-03-03 NOTE — PROGRESS NOTES
[address] [facility]  [address]  [address] [place] [id number]  Dept: [phone]  Loc: Josr Briones is a 47 y.o. female who presents today for her medical conditions/complaintsas noted below. Dejan Huddleston is c/o of Congestion; Headache; Emesis; Cough; and Facial Pain (pressure)        HPI:     Sinus Problem   This is a new problem. The current episode started 1 to 4 weeks ago. The problem has been gradually worsening since onset. The pain is moderate. Associated symptoms include congestion, coughing, headaches, a hoarse voice, sinus pressure and a sore throat. Treatments tried: Tylenol cold and sinus. The treatment provided mild relief. Emesis x1 yesterday  Going out of town tomorrow    No past medical history on file. Past Surgical History:   Procedure Laterality Date    ABDOMINAL EXPLORATION SURGERY      TONSILLECTOMY         No family history on file. Social History     Tobacco Use    Smoking status: Current Every Day Smoker     Packs/day: 1.00     Types: Cigarettes    Smokeless tobacco: Never Used   Substance Use Topics    Alcohol use: No      Current Outpatient Medications   Medication Sig Dispense Refill    amoxicillin (AMOXIL) 875 MG tablet Take 1 tablet by mouth 2 times daily for 10 days 20 tablet 0    predniSONE (DELTASONE) 10 MG tablet Take 1 tablet by mouth daily for 5 days 5 tablet 0    Fexofenadine HCl (MUCINEX ALLERGY PO) Take by mouth      ibuprofen (ADVIL;MOTRIN) 600 MG tablet Take 600 mg by mouth every 6 hours as needed for Pain      fluticasone (FLONASE) 50 MCG/ACT nasal spray 2 sprays by Nasal route daily 1 Bottle 0    MULTIPLE MINERALS-VITAMINS PO Take by mouth       No current facility-administered medications for this visit.       No Known Allergies    Health Maintenance   Topic Date Due    Pneumococcal 0-64 years Vaccine (1 of 1 - PPSV23) 03/12/1975    DTaP/Tdap/Td vaccine (1 - Tdap) 03/12/1980    HIV screen  03/12/1984    Cervical cancer screen  03/12/1990    Lipid screen  03/12/2009    Breast cancer screen  03/12/2019    Shingles Vaccine (1 of 2) 03/12/2019    Colon cancer screen colonoscopy  03/12/2019    Flu vaccine (1) 09/01/2019    Hepatitis A vaccine  Aged Out    Hepatitis B vaccine  Aged Out    Hib vaccine  Aged Out    Meningococcal (ACWY) vaccine  Aged Out       Subjective:     Review of Systems   HENT: Positive for congestion, hoarse voice, sinus pressure and sore throat. Respiratory: Positive for cough. Neurological: Positive for headaches. Objective:     Physical Exam  Vitals signs and nursing note reviewed. Constitutional:       General: She is not in acute distress. Appearance: Normal appearance. She is well-developed. She is not ill-appearing or toxic-appearing. HENT:      Head: Normocephalic and atraumatic. Right Ear: Hearing normal. A middle ear effusion is present. Left Ear: Hearing normal. A middle ear effusion is present. Nose: Congestion and rhinorrhea present. Right Sinus: Maxillary sinus tenderness present. Left Sinus: Maxillary sinus tenderness present. Mouth/Throat:      Mouth: Mucous membranes are moist.      Pharynx: Posterior oropharyngeal erythema present. Eyes:      General: Lids are normal.      Conjunctiva/sclera: Conjunctivae normal.      Pupils: Pupils are equal, round, and reactive to light. Cardiovascular:      Rate and Rhythm: Normal rate and regular rhythm. Heart sounds: Normal heart sounds. Pulmonary:      Effort: Pulmonary effort is normal.      Breath sounds: Normal breath sounds. Skin:     General: Skin is warm and dry. Neurological:      Mental Status: She is alert and oriented to person, place, and time. Psychiatric:         Speech: Speech normal.         Behavior: Behavior normal.         Thought Content:  Thought content normal.       BP (!) 148/90   Pulse 93   Temp 97.8 of the medicines you take. How can you care for yourself at home? · Take an over-the-counter pain medicine, such as acetaminophen (Tylenol), ibuprofen (Advil, Motrin), or naproxen (Aleve). Read and follow all instructions on the label. · If the doctor prescribed antibiotics, take them as directed. Do not stop taking them just because you feel better. You need to take the full course of antibiotics. · Be careful when taking over-the-counter cold or flu medicines and Tylenol at the same time. Many of these medicines have acetaminophen, which is Tylenol. Read the labels to make sure that you are not taking more than the recommended dose. Too much acetaminophen (Tylenol) can be harmful. · Breathe warm, moist air from a steamy shower, a hot bath, or a sink filled with hot water. Avoid cold, dry air. Using a humidifier in your home may help. Follow the directions for cleaning the machine. · Use saline (saltwater) nasal washes to help keep your nasal passages open and wash out mucus and bacteria. You can buy saline nose drops at a grocery store or drugstore. Or you can make your own at home by adding 1 teaspoon of salt and 1 teaspoon of baking soda to 2 cups of distilled water. If you make your own, fill a bulb syringe with the solution, insert the tip into your nostril, and squeeze gently. Sonna Player your nose. · Put a hot, wet towel or a warm gel pack on your face 3 or 4 times a day for 5 to 10 minutes each time. · Try a decongestant nasal spray like oxymetazoline (Afrin). Do not use it for more than 3 days in a row. Using it for more than 3 days can make your congestion worse. When should you call for help?   Call your doctor now or seek immediate medical care if:    · You have new or worse swelling or redness in your face or around your eyes.     · You have a new or higher fever.    Watch closely for changes in your health, and be sure to contact your doctor if:    · You have new or worse facial pain.     · The mucus

## 2020-03-04 NOTE — PATIENT INSTRUCTIONS

## 2020-05-15 ENCOUNTER — OFFICE VISIT (OUTPATIENT)
Dept: URGENT CARE | Age: 50
End: 2020-05-15
Payer: MEDICAID

## 2020-05-15 VITALS
WEIGHT: 194.2 LBS | DIASTOLIC BLOOD PRESSURE: 82 MMHG | RESPIRATION RATE: 16 BRPM | TEMPERATURE: 97.2 F | HEART RATE: 96 BPM | BODY MASS INDEX: 29.43 KG/M2 | HEIGHT: 68 IN | OXYGEN SATURATION: 98 % | SYSTOLIC BLOOD PRESSURE: 146 MMHG

## 2020-05-15 PROCEDURE — 99213 OFFICE O/P EST LOW 20 MIN: CPT | Performed by: NURSE PRACTITIONER

## 2020-05-15 RX ORDER — DOXYCYCLINE 100 MG/1
TABLET ORAL
COMMUNITY
Start: 2020-05-10 | End: 2021-09-05

## 2020-05-15 RX ORDER — PERMETHRIN 50 MG/G
CREAM TOPICAL
Qty: 1 TUBE | Refills: 1 | Status: SHIPPED | OUTPATIENT
Start: 2020-05-15 | End: 2021-09-05

## 2020-08-25 ENCOUNTER — TELEPHONE (OUTPATIENT)
Dept: URGENT CARE | Facility: CLINIC | Age: 50
End: 2020-08-25

## 2020-08-25 PROCEDURE — 87147 CULTURE TYPE IMMUNOLOGIC: CPT | Performed by: NURSE PRACTITIONER

## 2020-08-25 PROCEDURE — 87205 SMEAR GRAM STAIN: CPT | Performed by: NURSE PRACTITIONER

## 2020-08-25 PROCEDURE — 87186 SC STD MICRODIL/AGAR DIL: CPT | Performed by: NURSE PRACTITIONER

## 2020-08-25 PROCEDURE — 87070 CULTURE OTHR SPECIMN AEROBIC: CPT | Performed by: NURSE PRACTITIONER

## 2020-11-05 PROCEDURE — 87086 URINE CULTURE/COLONY COUNT: CPT | Performed by: NURSE PRACTITIONER

## 2021-05-24 ENCOUNTER — HOSPITAL ENCOUNTER (OUTPATIENT)
Dept: GENERAL RADIOLOGY | Facility: HOSPITAL | Age: 51
Discharge: HOME | End: 2021-05-24

## 2021-05-24 ENCOUNTER — TRANSCRIBE ORDERS (OUTPATIENT)
Dept: ADMINISTRATIVE | Facility: HOSPITAL | Age: 51
End: 2021-05-24

## 2021-05-24 DIAGNOSIS — M79.641 PAIN IN RIGHT HAND: Primary | ICD-10-CM

## 2021-05-24 DIAGNOSIS — M79.641 PAIN IN RIGHT HAND: ICD-10-CM

## 2021-05-24 PROCEDURE — 73130 X-RAY EXAM OF HAND: CPT

## 2021-09-05 ENCOUNTER — OFFICE VISIT (OUTPATIENT)
Age: 51
End: 2021-09-05

## 2021-09-05 ENCOUNTER — OFFICE VISIT (OUTPATIENT)
Dept: URGENT CARE | Age: 51
End: 2021-09-05
Payer: MEDICAID

## 2021-09-05 VITALS
OXYGEN SATURATION: 96 % | DIASTOLIC BLOOD PRESSURE: 90 MMHG | WEIGHT: 184 LBS | BODY MASS INDEX: 27.98 KG/M2 | RESPIRATION RATE: 18 BRPM | HEART RATE: 82 BPM | SYSTOLIC BLOOD PRESSURE: 150 MMHG | TEMPERATURE: 98 F

## 2021-09-05 DIAGNOSIS — J06.9 URI WITH COUGH AND CONGESTION: Primary | ICD-10-CM

## 2021-09-05 DIAGNOSIS — Z11.59 SCREENING FOR VIRAL DISEASE: Primary | ICD-10-CM

## 2021-09-05 LAB — SARS-COV-2, PCR: DETECTED

## 2021-09-05 PROCEDURE — 99999 PR OFFICE/OUTPT VISIT,PROCEDURE ONLY: CPT | Performed by: NURSE PRACTITIONER

## 2021-09-05 PROCEDURE — 99213 OFFICE O/P EST LOW 20 MIN: CPT | Performed by: NURSE PRACTITIONER

## 2021-09-05 RX ORDER — DEXAMETHASONE SODIUM PHOSPHATE 10 MG/ML
5 INJECTION INTRAMUSCULAR; INTRAVENOUS ONCE
Status: COMPLETED | OUTPATIENT
Start: 2021-09-05 | End: 2021-09-05

## 2021-09-05 RX ORDER — AZITHROMYCIN 250 MG/1
TABLET, FILM COATED ORAL
Qty: 1 PACKET | Refills: 0 | Status: SHIPPED | OUTPATIENT
Start: 2021-09-05 | End: 2021-09-15

## 2021-09-05 RX ORDER — METHYLPREDNISOLONE 4 MG/1
TABLET ORAL
Qty: 1 KIT | Refills: 0 | Status: SHIPPED | OUTPATIENT
Start: 2021-09-06 | End: 2021-11-29

## 2021-09-05 RX ORDER — BENZONATATE 100 MG/1
100 CAPSULE ORAL 3 TIMES DAILY PRN
Qty: 21 CAPSULE | Refills: 0 | Status: SHIPPED | OUTPATIENT
Start: 2021-09-05 | End: 2021-09-12

## 2021-09-05 RX ADMIN — DEXAMETHASONE SODIUM PHOSPHATE 5 MG: 10 INJECTION INTRAMUSCULAR; INTRAVENOUS at 13:22

## 2021-09-05 ASSESSMENT — ENCOUNTER SYMPTOMS
SORE THROAT: 0
ABDOMINAL PAIN: 0
RHINORRHEA: 0
NAUSEA: 0
DIARRHEA: 0
VOMITING: 0
SHORTNESS OF BREATH: 0
COUGH: 1

## 2021-09-05 NOTE — PROGRESS NOTES
(ADVIL;MOTRIN) 600 MG tablet Take 600 mg by mouth every 6 hours as needed for Pain      MULTIPLE MINERALS-VITAMINS PO Take by mouth      fluticasone (FLONASE) 50 MCG/ACT nasal spray 2 sprays by Nasal route daily (Patient not taking: Reported on 5/15/2020) 1 Bottle 0     No current facility-administered medications for this visit. No Known Allergies    Health Maintenance   Topic Date Due    Hepatitis C screen  Never done    Pneumococcal 0-64 years Vaccine (1 of 2 - PPSV23) Never done    COVID-19 Vaccine (1) Never done    HIV screen  Never done    Cervical cancer screen  Never done    Lipid screen  Never done    Colon cancer screen colonoscopy  Never done    Breast cancer screen  Never done    Shingles Vaccine (1 of 2) Never done    Flu vaccine (1) Never done    DTaP/Tdap/Td vaccine (2 - Td or Tdap) 08/25/2030    Hepatitis A vaccine  Aged Out    Hepatitis B vaccine  Aged Out    Hib vaccine  Aged Out    Meningococcal (ACWY) vaccine  Aged Out       Subjective:     Review of Systems   Constitutional: Positive for chills. Negative for fatigue and fever. HENT: Positive for congestion. Negative for ear pain, rhinorrhea and sore throat. Respiratory: Positive for cough. Negative for shortness of breath. Gastrointestinal: Negative for abdominal pain, diarrhea, nausea and vomiting. Musculoskeletal: Positive for myalgias. Skin: Negative for rash. Neurological: Negative for headaches. All other systems reviewed and are negative.      :Objective      Physical Exam  Nursing note reviewed. Constitutional:       General: She is not in acute distress. Appearance: Normal appearance. She is well-developed. She is ill-appearing. She is not diaphoretic. Comments: tearful   HENT:      Head: Normocephalic and atraumatic.       Right Ear: Tympanic membrane, ear canal and external ear normal.      Left Ear: Tympanic membrane, ear canal and external ear normal.      Nose: Nose normal. Mouth/Throat:      Mouth: Mucous membranes are moist.      Pharynx: Oropharynx is clear. Eyes:      Conjunctiva/sclera: Conjunctivae normal.      Pupils: Pupils are equal, round, and reactive to light. Cardiovascular:      Rate and Rhythm: Normal rate and regular rhythm. Heart sounds: Normal heart sounds. No murmur heard. Pulmonary:      Effort: Pulmonary effort is normal. No respiratory distress. Breath sounds: Normal breath sounds. No wheezing. Musculoskeletal:      Cervical back: Normal range of motion. Skin:     General: Skin is warm and dry. Findings: No rash. Neurological:      Mental Status: She is alert and oriented to person, place, and time. Psychiatric:         Mood and Affect: Mood normal.         Behavior: Behavior normal.       BP (!) 150/90   Pulse 82   Temp 98 °F (36.7 °C)   Resp 18   Wt 184 lb (83.5 kg)   LMP 12/24/2018   SpO2 96%   BMI 27.98 kg/m²     :Assessment       Diagnosis Orders   1. URI with cough and congestion  azithromycin (ZITHROMAX Z-TYRA) 250 MG tablet    benzonatate (TESSALON) 100 MG capsule       :Plan   1. Rest and increase fluid intake. Medrol- start this tomorrow. azithromycin as prescribed. 2. Covid-19 testing. Quarantine at home until results are called to you. If positive you will have to quarantine for 14days. If positive the health dept will also contact you. 3. Monitor for fever and treat as needed with tylenol or ibuprofen  4. If patient is not improving or developing any new/worsening symptoms then return to clinic as needed or go to ER. Patient is to follow up with PCP as needed. No orders of the defined types were placed in this encounter. No follow-ups on file. Orders Placed This Encounter   Medications    azithromycin (ZITHROMAX Z-TYRA) 250 MG tablet     Sig: Take as directed     Dispense:  1 packet     Refill:  0    methylPREDNISolone (MEDROL DOSEPACK) 4 MG tablet     Sig: Take by mouth.      Dispense:  1 kit Refill:  0    dexamethasone (DECADRON) injection 5 mg    benzonatate (TESSALON) 100 MG capsule     Sig: Take 1 capsule by mouth 3 times daily as needed for Cough     Dispense:  21 capsule     Refill:  0       Patient given educational materials- see patient instructions. Discussed use, benefit, and side effects of prescribedmedications. All patient questions answered. Pt voiced understanding. Patient Instructions       Patient Education        Viral Respiratory Infection: Care Instructions  Your Care Instructions     Viruses are very small organisms. They grow in number after they enter your body. There are many types that cause different illnesses, such as colds and the mumps. The symptoms of a viral respiratory infection often start quickly. They include a fever, sore throat, and runny nose. You may also just not feel well. Or you may not want to eat much. Most viral respiratory infections are not serious. They usually get better with time and self-care. Antibiotics are not used to treat a viral infection. That's because antibiotics will not help cure a viral illness. In some cases, antiviral medicine can help your body fight a serious viral infection. Follow-up care is a key part of your treatment and safety. Be sure to make and go to all appointments, and call your doctor if you are having problems. It's also a good idea to know your test results and keep a list of the medicines you take. How can you care for yourself at home? · Rest as much as possible until you feel better. · Be safe with medicines. Take your medicine exactly as prescribed. Call your doctor if you think you are having a problem with your medicine. You will get more details on the specific medicine your doctor prescribes. · Take an over-the-counter pain medicine, such as acetaminophen (Tylenol), ibuprofen (Advil, Motrin), or naproxen (Aleve), as needed for pain and fever. Read and follow all instructions on the label.  Do not give aspirin to anyone younger than 20. It has been linked to Reye syndrome, a serious illness. · Drink plenty of fluids. Hot fluids, such as tea or soup, may help relieve congestion in your nose and throat. If you have kidney, heart, or liver disease and have to limit fluids, talk with your doctor before you increase the amount of fluids you drink. · Try to clear mucus from your lungs by breathing deeply and coughing. · Gargle with warm salt water once an hour. This can help reduce swelling and throat pain. Use 1 teaspoon of salt mixed in 1 cup of warm water. · Do not smoke or allow others to smoke around you. If you need help quitting, talk to your doctor about stop-smoking programs and medicines. These can increase your chances of quitting for good. To avoid spreading the virus  · Cough or sneeze into a tissue. Then throw the tissue away. · If you don't have a tissue, use your hand to cover your cough or sneeze. Then clean your hand. You can also cough into your sleeve. · Wash your hands often. Use soap and warm water. Wash for 15 to 20 seconds each time. · If you don't have soap and water near you, you can clean your hands with alcohol wipes or gel. When should you call for help? Call your doctor now or seek immediate medical care if:    · You have a new or higher fever.     · Your fever lasts more than 48 hours.     · You have trouble breathing.     · You have a fever with a stiff neck or a severe headache.     · You are sensitive to light.     · You feel very sleepy or confused. Watch closely for changes in your health, and be sure to contact your doctor if:    · You do not get better as expected. Where can you learn more? Go to https://Pegastech.TechZel. org and sign in to your [facility] account. Enter M602 in the [employer] box to learn more about \"Viral Respiratory Infection: Care Instructions. \"     If you do not have an account, please click on the \"Sign Up Now\" link.  Current as of: October 26, 2020               Content Version: 12.9  © 6784-7885 [employer]. Care instructions adapted under license by [facility] ([facility]). If you have questions about a medical condition or this instruction, always ask your healthcare professional. [address] any warranty or liability for your use of this information. Patient Education        Learning About Coronavirus [phone]  What is coronavirus (COVID-19)? COVID-19 is a disease caused by a new type of coronavirus. This illness was first found in December 2019. It has since spread worldwide. Coronaviruses are a large group of viruses. They cause the common cold. They also cause more serious illnesses like [place] respiratory syndrome (MERS) and severe acute respiratory syndrome (SARS). COVID-19 is caused by a novel coronavirus. That means it's a new type that has not been seen in people before. What are the symptoms? Coronavirus (COVID-19) symptoms may include:  · Fever. · Cough. · Trouble breathing. · Chills or repeated shaking with chills. · Muscle pain. · Headache. · Sore throat. · New loss of taste or smell. · Vomiting. · Diarrhea. In severe cases, COVID-19 can cause pneumonia and make it hard to breathe without help from a machine. It can cause death. How is it diagnosed? COVID-19 is diagnosed with a viral test. This may also be called a PCR test or antigen test. It looks for evidence of the virus in your breathing passages or lungs (respiratory system). The test is most often done on a sample from the nose, throat, or lungs. It's sometimes done on a sample of saliva. One way a sample is collected is by putting a long swab into the back of your nose. How is it treated? Mild cases of COVID-19 can be treated at home.  Serious cases need treatment in the hospital. Treatment may include medicines to reduce symptoms, plus breathing support such as oxygen therapy or a grab with your hands. These include doorknobs, remote controls, phones, and handles on your refrigerator and microwave. And don't forget countertops, tabletops, bathrooms, and computer keyboards. When should you call for help? Call 911 anytime you think you may need emergency care. For example, call if you have life-threatening symptoms, such as:    · You have severe trouble breathing. (You can't talk at all.)     · You have constant chest pain or pressure.     · You are severely dizzy or lightheaded.     · You are confused or can't think clearly.     · Your face and lips have a blue color.     · You pass out (lose consciousness) or are very hard to wake up. Call your doctor now or seek immediate medical care if:    · You have moderate trouble breathing. (You can't speak a full sentence.)     · You are coughing up blood (more than about 1 teaspoon).     · You have signs of low blood pressure. These include feeling lightheaded; being too weak to stand; and having cold, pale, clammy skin. Watch closely for changes in your health, and be sure to contact your doctor if:    · Your symptoms get worse.     · You are not getting better as expected. Call before you go to the doctor's office. Follow their instructions. And wear a cloth face cover. Current as of: March 26, 2021               Content Version: 12.9  © 2006-2021 [employer]. Care instructions adapted under license by [facility] ([facility]). If you have questions about a medical condition or this instruction, always ask your healthcare professional. [place] [address] any warranty or liability for your use of this information. 1. Rest and increase fluid intake. Medrol- start this tomorrow. azithromycin as prescribed. 2. Covid-19 testing. Quarantine at home until results are called to you. If positive you will have to quarantine for 14days. If positive the health dept will also contact you.   3. Monitor for fever and treat as needed with tylenol or ibuprofen  4. If patient is not improving or developing any new/worsening symptoms then return to clinic as needed or go to ER. Patient is to follow up with PCP as needed.                Electronically signed by TRACY Abreu on 9/5/2021 at 1:33 PM

## 2021-09-05 NOTE — PATIENT INSTRUCTIONS
Patient Education        Viral Respiratory Infection: Care Instructions  Your Care Instructions     Viruses are very small organisms. They grow in number after they enter your body. There are many types that cause different illnesses, such as colds and the mumps. The symptoms of a viral respiratory infection often start quickly. They include a fever, sore throat, and runny nose. You may also just not feel well. Or you may not want to eat much. Most viral respiratory infections are not serious. They usually get better with time and self-care. Antibiotics are not used to treat a viral infection. That's because antibiotics will not help cure a viral illness. In some cases, antiviral medicine can help your body fight a serious viral infection. Follow-up care is a key part of your treatment and safety. Be sure to make and go to all appointments, and call your doctor if you are having problems. It's also a good idea to know your test results and keep a list of the medicines you take. How can you care for yourself at home? · Rest as much as possible until you feel better. · Be safe with medicines. Take your medicine exactly as prescribed. Call your doctor if you think you are having a problem with your medicine. You will get more details on the specific medicine your doctor prescribes. · Take an over-the-counter pain medicine, such as acetaminophen (Tylenol), ibuprofen (Advil, Motrin), or naproxen (Aleve), as needed for pain and fever. Read and follow all instructions on the label. Do not give aspirin to anyone younger than 20. It has been linked to Reye syndrome, a serious illness. · Drink plenty of fluids. Hot fluids, such as tea or soup, may help relieve congestion in your nose and throat. If you have kidney, heart, or liver disease and have to limit fluids, talk with your doctor before you increase the amount of fluids you drink.   · Try to clear mucus from your lungs by breathing deeply and coughing. · Gargle with warm salt water once an hour. This can help reduce swelling and throat pain. Use 1 teaspoon of salt mixed in 1 cup of warm water. · Do not smoke or allow others to smoke around you. If you need help quitting, talk to your doctor about stop-smoking programs and medicines. These can increase your chances of quitting for good. To avoid spreading the virus  · Cough or sneeze into a tissue. Then throw the tissue away. · If you don't have a tissue, use your hand to cover your cough or sneeze. Then clean your hand. You can also cough into your sleeve. · Wash your hands often. Use soap and warm water. Wash for 15 to 20 seconds each time. · If you don't have soap and water near you, you can clean your hands with alcohol wipes or gel. When should you call for help? Call your doctor now or seek immediate medical care if:    · You have a new or higher fever.     · Your fever lasts more than 48 hours.     · You have trouble breathing.     · You have a fever with a stiff neck or a severe headache.     · You are sensitive to light.     · You feel very sleepy or confused. Watch closely for changes in your health, and be sure to contact your doctor if:    · You do not get better as expected. Where can you learn more? Go to https://ShopTappemelanieBlueVox.alaTest. org and sign in to your [employer] account. Enter X897 in the [facility] box to learn more about \"Viral Respiratory Infection: Care Instructions. \"     If you do not have an account, please click on the \"Sign Up Now\" link. Current as of: October 26, 2020               Content Version: 12.9  © 2006-2021 [employer]. Care instructions adapted under license by [facility] ([facility]). If you have questions about a medical condition or this instruction, always ask your healthcare professional. [address] any warranty or liability for your use of this information.          Patient Education        Learning About Coronavirus (COVID-19)  What is coronavirus (COVID-19)? COVID-19 is a disease caused by a new type of coronavirus. This illness was first found in December 2019. It has since spread worldwide. Coronaviruses are a large group of viruses. They cause the common cold. They also cause more serious illnesses like [place] respiratory syndrome (MERS) and severe acute respiratory syndrome (SARS). COVID-19 is caused by a novel coronavirus. That means it's a new type that has not been seen in people before. What are the symptoms? Coronavirus (COVID-19) symptoms may include:  · Fever. · Cough. · Trouble breathing. · Chills or repeated shaking with chills. · Muscle pain. · Headache. · Sore throat. · New loss of taste or smell. · Vomiting. · Diarrhea. In severe cases, COVID-19 can cause pneumonia and make it hard to breathe without help from a machine. It can cause death. How is it diagnosed? COVID-19 is diagnosed with a viral test. This may also be called a PCR test or antigen test. It looks for evidence of the virus in your breathing passages or lungs (respiratory system). The test is most often done on a sample from the nose, throat, or lungs. It's sometimes done on a sample of saliva. One way a sample is collected is by putting a long swab into the back of your nose. How is it treated? Mild cases of COVID-19 can be treated at home. Serious cases need treatment in the hospital. Treatment may include medicines to reduce symptoms, plus breathing support such as oxygen therapy or a ventilator. Some people may be placed on their belly to help their oxygen levels. Treatments that may help people who have COVID-19 include:  Antiviral medicines. These medicines treat viral infections. Remdesivir is an example. Immune-based therapy. These medicines help the immune system fight COVID-19. One example is bamlanivimab. It's a monoclonal antibody. Blood thinners.    These medicines help prevent blood clots. People with severe illness are at risk for blood clots. How can you protect yourself and others? The best way to protect yourself from getting sick is to:  · Avoid areas where there is an outbreak. · Avoid contact with people who may be infected. · Avoid crowds and try to stay at least 6 feet away from other people. · Wash your hands often, especially after you cough or sneeze. Use soap and water, and scrub for at least 20 seconds. If soap and water aren't available, use an alcohol-based hand . · Avoid touching your mouth, nose, and eyes. To help avoid spreading the virus to others:  · Stay home if you are sick or have been exposed to the virus. Don't go to school, work, or public areas. And don't use public transportation, ride-shares, or taxis unless you have no choice. · Wear a cloth face cover if you have to go to public areas. · Cover your mouth with a tissue when you cough or sneeze. Then throw the tissue in the trash and wash your hands right away. · If you're sick:  ? Leave your home only if you need to get medical care. But call the doctor's office first so they know you're coming. And wear a face cover. ? Wear the face cover whenever you're around other people. It can help stop the spread of the virus. ? Limit contact with pets and people in your home. If possible, stay in a separate bedroom and use a separate bathroom. ? Clean and disinfect your home every day. Use household  and disinfectant wipes or sprays. Take special care to clean things that you grab with your hands. These include doorknobs, remote controls, phones, and handles on your refrigerator and microwave. And don't forget countertops, tabletops, bathrooms, and computer keyboards. When should you call for help? Call 911 anytime you think you may need emergency care. For example, call if you have life-threatening symptoms, such as:    · You have severe trouble breathing.  (You can't talk at all.)     · You have constant chest pain or pressure.     · You are severely dizzy or lightheaded.     · You are confused or can't think clearly.     · Your face and lips have a blue color.     · You pass out (lose consciousness) or are very hard to wake up. Call your doctor now or seek immediate medical care if:    · You have moderate trouble breathing. (You can't speak a full sentence.)     · You are coughing up blood (more than about 1 teaspoon).     · You have signs of low blood pressure. These include feeling lightheaded; being too weak to stand; and having cold, pale, clammy skin. Watch closely for changes in your health, and be sure to contact your doctor if:    · Your symptoms get worse.     · You are not getting better as expected. Call before you go to the doctor's office. Follow their instructions. And wear a cloth face cover. Current as of: March 26, 2021               Content Version: 12.9  © 2006-2021 [employer]. Care instructions adapted under license by [facility] ([facility]). If you have questions about a medical condition or this instruction, always ask your healthcare professional. [place] [address] any warranty or liability for your use of this information. 1. Rest and increase fluid intake. Medrol- start this tomorrow. azithromycin as prescribed. 2. Covid-19 testing. Quarantine at home until results are called to you. If positive you will have to quarantine for 14days. If positive the health dept will also contact you. 3. Monitor for fever and treat as needed with tylenol or ibuprofen  4. If patient is not improving or developing any new/worsening symptoms then return to clinic as needed or go to ER. Patient is to follow up with PCP as needed.

## 2021-11-29 ENCOUNTER — OFFICE VISIT (OUTPATIENT)
Dept: URGENT CARE | Age: 51
End: 2021-11-29
Payer: MEDICAID

## 2021-11-29 VITALS
SYSTOLIC BLOOD PRESSURE: 156 MMHG | OXYGEN SATURATION: 98 % | BODY MASS INDEX: 30.95 KG/M2 | DIASTOLIC BLOOD PRESSURE: 78 MMHG | HEART RATE: 86 BPM | WEIGHT: 197.2 LBS | TEMPERATURE: 98.4 F | RESPIRATION RATE: 18 BRPM | HEIGHT: 67 IN

## 2021-11-29 DIAGNOSIS — J06.9 URI WITH COUGH AND CONGESTION: ICD-10-CM

## 2021-11-29 DIAGNOSIS — R05.9 COUGH: ICD-10-CM

## 2021-11-29 DIAGNOSIS — J01.90 ACUTE SINUSITIS, RECURRENCE NOT SPECIFIED, UNSPECIFIED LOCATION: Primary | ICD-10-CM

## 2021-11-29 LAB
INFLUENZA A ANTIBODY: NORMAL
INFLUENZA B ANTIBODY: NORMAL
S PYO AG THROAT QL: NORMAL

## 2021-11-29 PROCEDURE — 99213 OFFICE O/P EST LOW 20 MIN: CPT | Performed by: NURSE PRACTITIONER

## 2021-11-29 PROCEDURE — 87804 INFLUENZA ASSAY W/OPTIC: CPT | Performed by: NURSE PRACTITIONER

## 2021-11-29 PROCEDURE — 87880 STREP A ASSAY W/OPTIC: CPT | Performed by: NURSE PRACTITIONER

## 2021-11-29 RX ORDER — AMOXICILLIN AND CLAVULANATE POTASSIUM 875; 125 MG/1; MG/1
1 TABLET, FILM COATED ORAL 2 TIMES DAILY
Qty: 20 TABLET | Refills: 0 | Status: SHIPPED | OUTPATIENT
Start: 2021-11-29 | End: 2021-12-09

## 2021-11-29 RX ORDER — DEXAMETHASONE SODIUM PHOSPHATE 10 MG/ML
10 INJECTION INTRAMUSCULAR; INTRAVENOUS ONCE
Status: COMPLETED | OUTPATIENT
Start: 2021-11-29 | End: 2021-11-29

## 2021-11-29 RX ADMIN — DEXAMETHASONE SODIUM PHOSPHATE 10 MG: 10 INJECTION INTRAMUSCULAR; INTRAVENOUS at 16:13

## 2021-11-29 ASSESSMENT — ENCOUNTER SYMPTOMS
SORE THROAT: 1
COUGH: 1

## 2021-11-29 NOTE — PATIENT INSTRUCTIONS
nose.  · Put a hot, wet towel or a warm gel pack on your face 3 or 4 times a day for 5 to 10 minutes each time. · Try a decongestant nasal spray like oxymetazoline (Afrin). Do not use it for more than 3 days in a row. Using it for more than 3 days can make your congestion worse. When should you call for help? Call your doctor now or seek immediate medical care if:    · You have new or worse swelling or redness in your face or around your eyes.     · You have a new or higher fever. Watch closely for changes in your health, and be sure to contact your doctor if:    · You have new or worse facial pain.     · The mucus from your nose becomes thicker (like pus) or has new blood in it.     · You are not getting better as expected. Where can you learn more? Go to https://Lotour.comperiskmethods.[email or web address] org and sign in to your Clari account. Enter Y438 in the Double R Group box to learn more about \"Sinusitis: Care Instructions. \"     If you do not have an account, please click on the \"Sign Up Now\" link. Current as of: December 2, 2020               Content Version: 13.0  © 2006-2021 Votigo. Care instructions adapted under license by [facility] ([place] [facility]). If you have questions about a medical condition or this instruction, always ask your healthcare professional. [place] [address] any warranty or liability for your use of this information. Patient Education        Upper Respiratory Infection (Cold): Care Instructions  Your Care Instructions     An upper respiratory infection, or URI, is an infection of the nose, sinuses, or throat. URIs are spread by coughs, sneezes, and direct contact. The common cold is the most frequent kind of URI. The flu and sinus infections are other kinds of URIs. Almost all URIs are caused by viruses. Antibiotics won't cure them. But you can treat most infections with home care.  This may include drinking lots of fluids and taking over-the-counter pain medicine. You will probably feel better in 4 to 10 days. The doctor has checked you carefully, but problems can develop later. If you notice any problems or new symptoms, get medical treatment right away. Follow-up care is a key part of your treatment and safety. Be sure to make and go to all appointments, and call your doctor if you are having problems. It's also a good idea to know your test results and keep a list of the medicines you take. How can you care for yourself at home? · To prevent dehydration, drink plenty of fluids. Choose water and other clear liquids until you feel better. If you have kidney, heart, or liver disease and have to limit fluids, talk with your doctor before you increase the amount of fluids you drink. · Take an over-the-counter pain medicine, such as acetaminophen (Tylenol), ibuprofen (Advil, Motrin), or naproxen (Aleve). Read and follow all instructions on the label. · Before you use cough and cold medicines, check the label. These medicines may not be safe for young children or for people with certain health problems. · Be careful when taking over-the-counter cold or flu medicines and Tylenol at the same time. Many of these medicines have acetaminophen, which is Tylenol. Read the labels to make sure that you are not taking more than the recommended dose. Too much acetaminophen (Tylenol) can be harmful. · Get plenty of rest.  · Do not smoke or allow others to smoke around you. If you need help quitting, talk to your doctor about stop-smoking programs and medicines. These can increase your chances of quitting for good. When should you call for help? Call 911 anytime you think you may need emergency care. For example, call if:    · You have severe trouble breathing.    Call your doctor now or seek immediate medical care if:    · You seem to be getting much sicker.     · You have new or worse trouble breathing.     · You have a new or higher fever.     · You have a new rash. Watch closely for changes in your health, and be sure to contact your doctor if:    · You have a new symptom, such as a sore throat, an earache, or sinus pain.     · You cough more deeply or more often, especially if you notice more mucus or a change in the color of your mucus.     · You do not get better as expected. Where can you learn more? Go to https://TurnKey Vacation RentalspeCrowsnest Labseb.HouseTab. org and sign in to your Zuffle account. Enter Z303 in the Telecom Transport Management box to learn more about \"Upper Respiratory Infection (Cold): Care Instructions. \"     If you do not have an account, please click on the \"Sign Up Now\" link. Current as of: July 6, 2021               Content Version: 13.0  © 3004-7244 Healthwise, [place]. Care instructions adapted under license by [facility] ([facility]). If you have questions about a medical condition or this instruction, always ask your healthcare professional. [address] any warranty or liability for your use of this information. 1. Take full course of antibiotics  2. Steroid IM in office   3. Rest and stay hydrated   4. Monitor fever and treat as needed  5. May use OTC claritin/zyrtec and nasal spray such as flonase to help symptoms  6. If patient is not improving or developing any new/worsening symptoms then return to clinic as needed or go to ER. Patient is to follow up with PCP as needed.

## 2021-11-29 NOTE — PROGRESS NOTES
[address] [facility] URGENT [facility]ARE  [address] [place] [address] Shazia Vazquez [address]  Dept: [phone]  Dept Fax: [phone]  Loc: [phone]    Laverne Colindres is a 45 y.o. female who presents today for her medical conditions/complaintsas noted below. Laverne Colindres is c/o of Congestion, Pharyngitis, and Otalgia        HPI:     Pharyngitis  This is a new problem. The current episode started in the past 7 days. The problem occurs constantly. The problem has been rapidly worsening. Associated symptoms include congestion, coughing and a sore throat. Pertinent negatives include no chills, fever or rash. Nothing aggravates the symptoms. She has tried nothing for the symptoms. Otalgia   Associated symptoms include coughing and a sore throat. Pertinent negatives include no rash. Past Medical History:   Diagnosis Date    COVID 09/2021     Past Surgical History:   Procedure Laterality Date    ABDOMINAL EXPLORATION SURGERY      TONSILLECTOMY         History reviewed. No pertinent family history. Social History     Tobacco Use    Smoking status: Current Every Day Smoker     Packs/day: 1.00     Types: Cigarettes    Smokeless tobacco: Never Used   Substance Use Topics    Alcohol use: No      Current Outpatient Medications   Medication Sig Dispense Refill    amoxicillin-clavulanate (AUGMENTIN) 875-125 MG per tablet Take 1 tablet by mouth 2 times daily for 10 days 20 tablet 0    Fexofenadine HCl (MUCINEX ALLERGY PO) Take by mouth      ibuprofen (ADVIL;MOTRIN) 600 MG tablet Take 600 mg by mouth every 6 hours as needed for Pain      fluticasone (FLONASE) 50 MCG/ACT nasal spray 2 sprays by Nasal route daily 1 Bottle 0    MULTIPLE MINERALS-VITAMINS PO Take by mouth       No current facility-administered medications for this visit.      No Known Allergies    Health Maintenance   Topic Date Due    Hepatitis C screen  Never done    Pneumococcal 0-64 years Vaccine (1 of 2 - PPSV23) Never done    COVID-19 Vaccine (1) Never done    HIV screen  Never done    Cervical cancer screen  Never done    Lipid screen  Never done    Colon cancer screen colonoscopy  Never done    Breast cancer screen  Never done    Shingles Vaccine (1 of 2) Never done    Flu vaccine (1) Never done    DTaP/Tdap/Td vaccine (2 - Td or Tdap) 08/25/2030    Hepatitis A vaccine  Aged Out    Hepatitis B vaccine  Aged Out    Hib vaccine  Aged Out    Meningococcal (ACWY) vaccine  Aged Out       Subjective:     Review of Systems   Constitutional: Negative for chills and fever. HENT: Positive for congestion, ear pain and sore throat. Respiratory: Positive for cough. Skin: Negative for rash. All other systems reviewed and are negative.      :Objective      Physical Exam  Vitals and nursing note reviewed. Constitutional:       General: She is not in acute distress. Appearance: Normal appearance. She is well-developed. She is ill-appearing. She is not diaphoretic. HENT:      Head: Normocephalic and atraumatic. Right Ear: Tympanic membrane, ear canal and external ear normal.      Left Ear: Tympanic membrane, ear canal and external ear normal.      Nose: Congestion present. Mouth/Throat:      Mouth: Mucous membranes are moist.      Pharynx: Oropharynx is clear. Eyes:      Conjunctiva/sclera: Conjunctivae normal.      Pupils: Pupils are equal, round, and reactive to light. Cardiovascular:      Rate and Rhythm: Normal rate and regular rhythm. Heart sounds: Normal heart sounds. No murmur heard. Pulmonary:      Effort: Pulmonary effort is normal. No respiratory distress. Breath sounds: Normal breath sounds. No wheezing. Musculoskeletal:      Cervical back: Normal range of motion. Skin:     General: Skin is warm and dry. Findings: No rash. Neurological:      Mental Status: She is alert and oriented to person, place, and time.    Psychiatric:         Mood and Affect: Mood normal.         Behavior: Behavior normal.       BP (!) 156/78   Pulse 86   Temp 98.4 °F (36.9 °C)   Resp 18   Ht 5' 7\" (1.702 m)   Wt 197 lb 3.2 oz (89.4 kg)   LMP 12/24/2018   SpO2 98%   BMI 30.89 kg/m²     :Assessment       Diagnosis Orders   1. Acute sinusitis, recurrence not specified, unspecified location  dexamethasone (DECADRON) injection 10 mg    amoxicillin-clavulanate (AUGMENTIN) 875-125 MG per tablet   2. Cough  POCT rapid strep A    POCT Influenza A/B   3. URI with cough and congestion  dexamethasone (DECADRON) injection 10 mg       :Plan    1. Take full course of antibiotics  2. Steroid IM in office   3. Rest and stay hydrated   4. Monitor fever and treat as needed  5. May use OTC claritin/zyrtec and nasal spray such as flonase to help symptoms  6. If patient is not improving or developing any new/worsening symptoms then return to clinic as needed or go to ER. Patient is to follow up with PCP as needed. Orders Placed This Encounter   Procedures    POCT rapid strep A    POCT Influenza A/B     Results for orders placed or performed in visit on 11/29/21   POCT rapid strep A   Result Value Ref Range    Strep A Ag None Detected None Detected   POCT Influenza A/B   Result Value Ref Range    Influenza A Ab none detected     Influenza B Ab none detected          No follow-ups on file. Orders Placed This Encounter   Medications    dexamethasone (DECADRON) injection 10 mg    amoxicillin-clavulanate (AUGMENTIN) 875-125 MG per tablet     Sig: Take 1 tablet by mouth 2 times daily for 10 days     Dispense:  20 tablet     Refill:  0       Patient given educational materials- see patient instructions. Discussed use, benefit, and side effects of prescribedmedications. All patient questions answered. Pt voiced understanding.      Patient Instructions       Patient Education        Sinusitis: Care Instructions  Your Care Instructions     Sinusitis is an infection of the lining of the sinus cavities in your head. Sinusitis often follows a cold. It causes pain and pressure in your head and face. In most cases, sinusitis gets better on its own in 1 to 2 weeks. But some mild symptoms may last for several weeks. Sometimes antibiotics are needed. Follow-up care is a key part of your treatment and safety. Be sure to make and go to all appointments, and call your doctor if you are having problems. It's also a good idea to know your test results and keep a list of the medicines you take. How can you care for yourself at home? · Take an over-the-counter pain medicine, such as acetaminophen (Tylenol), ibuprofen (Advil, Motrin), or naproxen (Aleve). Read and follow all instructions on the label. · If the doctor prescribed antibiotics, take them as directed. Do not stop taking them just because you feel better. You need to take the full course of antibiotics. · Be careful when taking over-the-counter cold or flu medicines and Tylenol at the same time. Many of these medicines have acetaminophen, which is Tylenol. Read the labels to make sure that you are not taking more than the recommended dose. Too much acetaminophen (Tylenol) can be harmful. · Breathe warm, moist air from a steamy shower, a hot bath, or a sink filled with hot water. Avoid cold, dry air. Using a humidifier in your home may help. Follow the directions for cleaning the machine. · Use saline (saltwater) nasal washes. This can help keep your nasal passages open and wash out mucus and bacteria. You can buy saline nose drops at a grocery store or drugstore. Or you can make your own at home by adding 1 teaspoon of salt and 1 teaspoon of baking soda to 2 cups of distilled water. If you make your own, fill a bulb syringe with the solution, insert the tip into your nostril, and squeeze gently. Rolley Prayer your nose. · Put a hot, wet towel or a warm gel pack on your face 3 or 4 times a day for 5 to 10 minutes each time.   · Try a decongestant nasal spray like oxymetazoline (Afrin). Do not use it for more than 3 days in a row. Using it for more than 3 days can make your congestion worse. When should you call for help? Call your doctor now or seek immediate medical care if:    · You have new or worse swelling or redness in your face or around your eyes.     · You have a new or higher fever. Watch closely for changes in your health, and be sure to contact your doctor if:    · You have new or worse facial pain.     · The mucus from your nose becomes thicker (like pus) or has new blood in it.     · You are not getting better as expected. Where can you learn more? Go to https://BioTrace MedicalpeTune [email or web address].[email or web address] Company. org and sign in to your "[employer]" account. Enter A233 in the ClearAccess box to learn more about \"Sinusitis: Care Instructions. \"     If you do not have an account, please click on the \"Sign Up Now\" link. Current as of: December 2, 2020               Content Version: 13.0  © 2006-2021 Bliss[employer]et [employer]. Care instructions adapted under license by [facility] ([facility]). If you have questions about a medical condition or this instruction, always ask your healthcare professional. [place] [address] any warranty or liability for your use of this information. Patient Education        Upper Respiratory Infection (Cold): Care Instructions  Your Care Instructions     An upper respiratory infection, or URI, is an infection of the nose, sinuses, or throat. URIs are spread by coughs, sneezes, and direct contact. The common cold is the most frequent kind of URI. The flu and sinus infections are other kinds of URIs. Almost all URIs are caused by viruses. Antibiotics won't cure them. But you can treat most infections with home care. This may include drinking lots of fluids and taking over-the-counter pain medicine. You will probably feel better in 4 to 10 days. The doctor has checked you carefully, but problems can develop later.  If you notice any problems or new symptoms, get medical treatment right away. Follow-up care is a key part of your treatment and safety. Be sure to make and go to all appointments, and call your doctor if you are having problems. It's also a good idea to know your test results and keep a list of the medicines you take. How can you care for yourself at home? · To prevent dehydration, drink plenty of fluids. Choose water and other clear liquids until you feel better. If you have kidney, heart, or liver disease and have to limit fluids, talk with your doctor before you increase the amount of fluids you drink. · Take an over-the-counter pain medicine, such as acetaminophen (Tylenol), ibuprofen (Advil, Motrin), or naproxen (Aleve). Read and follow all instructions on the label. · Before you use cough and cold medicines, check the label. These medicines may not be safe for young children or for people with certain health problems. · Be careful when taking over-the-counter cold or flu medicines and Tylenol at the same time. Many of these medicines have acetaminophen, which is Tylenol. Read the labels to make sure that you are not taking more than the recommended dose. Too much acetaminophen (Tylenol) can be harmful. · Get plenty of rest.  · Do not smoke or allow others to smoke around you. If you need help quitting, talk to your doctor about stop-smoking programs and medicines. These can increase your chances of quitting for good. When should you call for help? Call 911 anytime you think you may need emergency care. For example, call if:    · You have severe trouble breathing. Call your doctor now or seek immediate medical care if:    · You seem to be getting much sicker.     · You have new or worse trouble breathing.     · You have a new or higher fever.     · You have a new rash.    Watch closely for changes in your health, and be sure to contact your doctor if:    · You have a new symptom, such as a sore throat, an earache, or sinus pain.     · You cough more deeply or more often, especially if you notice more mucus or a change in the color of your mucus.     · You do not get better as expected. Where can you learn more? Go to https://chpemelanieeb.WholeWorldBand. org and sign in to your Infused Medical Technologyhart account. Enter J255 in the EnterMedia box to learn more about \"Upper Respiratory Infection (Cold): Care Instructions. \"     If you do not have an account, please click on the \"Sign Up Now\" link. Current as of: July 6, 2021               Content Version: 13.0  © 0051-7821 Zoodles. Care instructions adapted under license by [facility] ([facility]). If you have questions about a medical condition or this instruction, always ask your healthcare professional. [address] any warranty or liability for your use of this information. 1. Take full course of antibiotics  2. Steroid IM in office   3. Rest and stay hydrated   4. Monitor fever and treat as needed  5. May use OTC claritin/zyrtec and nasal spray such as flonase to help symptoms  6. If patient is not improving or developing any new/worsening symptoms then return to clinic as needed or go to ER. Patient is to follow up with PCP as needed.              Electronically signed by TRACY Barbosa on 11/29/2021 at 4:30 PM

## 2022-01-07 ENCOUNTER — OFFICE VISIT (OUTPATIENT)
Age: 52
End: 2022-01-07
Payer: MEDICAID

## 2022-01-07 VITALS
HEART RATE: 94 BPM | OXYGEN SATURATION: 97 % | WEIGHT: 201.8 LBS | RESPIRATION RATE: 24 BRPM | DIASTOLIC BLOOD PRESSURE: 74 MMHG | SYSTOLIC BLOOD PRESSURE: 132 MMHG | TEMPERATURE: 97.3 F | BODY MASS INDEX: 31.67 KG/M2 | HEIGHT: 67 IN

## 2022-01-07 DIAGNOSIS — H60.391 SKIN OF RIGHT EARLOBE WITH INFECTION: Primary | ICD-10-CM

## 2022-01-07 PROCEDURE — 99213 OFFICE O/P EST LOW 20 MIN: CPT | Performed by: NURSE PRACTITIONER

## 2022-01-07 RX ORDER — CEPHALEXIN 500 MG/1
500 CAPSULE ORAL 3 TIMES DAILY
Qty: 21 CAPSULE | Refills: 0 | Status: SHIPPED | OUTPATIENT
Start: 2022-01-07 | End: 2022-01-14

## 2022-01-07 NOTE — PATIENT INSTRUCTIONS
1. Antibiotic as prescribed   2. Warm compresses   3. If patient is not improving or developing any new/worsening symptoms then return to clinic. Patient is to follow up with PCP as needed.

## 2022-01-07 NOTE — PROGRESS NOTES
[address] [facility]RGENT [facility]  [address] [address] Shazia Vazquez [address]  Dept: [phone]  Dept Fax: [phone]: [phone]    Jez Borges is a 45 y.o. female who presents today for her medical conditions/complaintsas noted below. Jez Borges is c/o of Otalgia (Right)        HPI:     HPI  Gonzalo Shearer presents today with pain to her right ear. Noticed it a few days ago. Noticed drainage this morning. No fever. Painful to touch. Has tried heating pad and otc ear drops without relief. Past Medical History:   Diagnosis Date    COVID 2021     Past Surgical History:   Procedure Laterality Date    ABDOMINAL EXPLORATION SURGERY      TONSILLECTOMY         History reviewed. No pertinent family history. Social History     Tobacco Use    Smoking status: Former Smoker     Packs/day: 1.00     Types: Cigarettes     Quit date: 9/10/2021     Years since quittin.3    Smokeless tobacco: Never Used   Substance Use Topics    Alcohol use: No      Current Outpatient Medications   Medication Sig Dispense Refill    cephALEXin (KEFLEX) 500 MG capsule Take 1 capsule by mouth 3 times daily for 7 days 21 capsule 0    ibuprofen (ADVIL;MOTRIN) 600 MG tablet Take 600 mg by mouth every 6 hours as needed for Pain      MULTIPLE MINERALS-VITAMINS PO Take by mouth      Fexofenadine HCl (MUCINEX ALLERGY PO) Take by mouth (Patient not taking: Reported on 2022)      fluticasone (FLONASE) 50 MCG/ACT nasal spray 2 sprays by Nasal route daily (Patient not taking: Reported on 2022) 1 Bottle 0     No current facility-administered medications for this visit.      No Known Allergies    Health Maintenance   Topic Date Due    Hepatitis C screen  Never done    COVID-19 Vaccine (1) Never done    Pneumococcal 0-64 years Vaccine (1 of 2 - PPSV23) Never done    Depression Screen  Never done    HIV screen  Never done    Cervical cancer screen  Never done   Ofelia Cunningham Diabetes screen  Never done    Lipid screen  Never done    Colon cancer screen colonoscopy  Never done    Breast cancer screen  Never done    Shingles Vaccine (1 of 2) Never done    Flu vaccine (1) Never done    DTaP/Tdap/Td vaccine (2 - Td or Tdap) 08/25/2030    Hepatitis A vaccine  Aged Out    Hepatitis B vaccine  Aged Out    Hib vaccine  Aged Out    Meningococcal (ACWY) vaccine  Aged Out       Subjective:     Review of Systems   Constitutional: Negative for chills and fever. HENT: Positive for ear discharge and ear pain. All other systems reviewed and are negative.      :Objective      Physical Exam  Vitals and nursing note reviewed. Constitutional:       Appearance: Normal appearance. HENT:      Head: Normocephalic and atraumatic. Left Ear: Tympanic membrane, ear canal and external ear normal.      Ears:     Eyes:      Conjunctiva/sclera: Conjunctivae normal.      Pupils: Pupils are equal, round, and reactive to light. Skin:     General: Skin is warm and dry. Neurological:      Mental Status: She is alert and oriented to person, place, and time. Psychiatric:         Mood and Affect: Mood normal.       /74   Pulse 94   Temp 97.3 °F (36.3 °C)   Resp 24   Ht 5' 7\" (1.702 m)   Wt 201 lb 12.8 oz (91.5 kg)   LMP 12/24/2018   SpO2 97%   BMI 31.61 kg/m²     :Assessment       Diagnosis Orders   1. Skin of right earlobe with infection  cephALEXin (KEFLEX) 500 MG capsule       :Plan   1. Antibiotic as prescribed   2. Warm compresses   3. If patient is not improving or developing any new/worsening symptoms then return to clinic. Patient is to follow up with PCP as needed. No orders of the defined types were placed in this encounter. No follow-ups on file.     Orders Placed This Encounter   Medications    cephALEXin (KEFLEX) 500 MG capsule     Sig: Take 1 capsule by mouth 3 times daily for 7 days     Dispense:  21 capsule     Refill:  0       Patient given educational materials- see patient instructions. Discussed use, benefit, and side effects of prescribedmedications. All patient questions answered. Pt voiced understanding. Patient Instructions   1. Antibiotic as prescribed   2. Warm compresses   3. If patient is not improving or developing any new/worsening symptoms then return to clinic. Patient is to follow up with PCP as needed.            Electronically signed by TRACY Nelson on 1/7/2022 at 3:27 PM

## 2022-03-30 ENCOUNTER — OFFICE VISIT (OUTPATIENT)
Age: 52
End: 2022-03-30
Payer: MEDICAID

## 2022-03-30 VITALS
DIASTOLIC BLOOD PRESSURE: 76 MMHG | TEMPERATURE: 97.3 F | HEIGHT: 67 IN | OXYGEN SATURATION: 98 % | WEIGHT: 205.4 LBS | BODY MASS INDEX: 32.24 KG/M2 | HEART RATE: 83 BPM | SYSTOLIC BLOOD PRESSURE: 124 MMHG | RESPIRATION RATE: 18 BRPM

## 2022-03-30 DIAGNOSIS — R30.0 DYSURIA: ICD-10-CM

## 2022-03-30 DIAGNOSIS — N30.00 ACUTE CYSTITIS WITHOUT HEMATURIA: Primary | ICD-10-CM

## 2022-03-30 LAB
APPEARANCE FLUID: CLEAR
BILIRUBIN, POC: NORMAL
BLOOD URINE, POC: NORMAL
CLARITY, POC: NORMAL
COLOR, POC: YELLOW
GLUCOSE URINE, POC: NORMAL
KETONES, POC: NORMAL
LEUKOCYTE EST, POC: NORMAL
NITRITE, POC: NORMAL
PH, POC: 7
PROTEIN, POC: NORMAL
SPECIFIC GRAVITY, POC: >=1.03
UROBILINOGEN, POC: NORMAL

## 2022-03-30 PROCEDURE — 81002 URINALYSIS NONAUTO W/O SCOPE: CPT

## 2022-03-30 PROCEDURE — 99213 OFFICE O/P EST LOW 20 MIN: CPT

## 2022-03-30 RX ORDER — NITROFURANTOIN 25; 75 MG/1; MG/1
100 CAPSULE ORAL 2 TIMES DAILY
Qty: 10 CAPSULE | Refills: 0 | Status: SHIPPED | OUTPATIENT
Start: 2022-03-30 | End: 2022-04-04

## 2022-03-30 ASSESSMENT — ENCOUNTER SYMPTOMS
VOMITING: 0
ABDOMINAL PAIN: 0

## 2022-03-30 NOTE — PATIENT INSTRUCTIONS
1. Take full course of antibiotics  2. Increase water  3. Avoid baths or hot tubs  4. We will call with urine culture results  5. Follow up with PCP as needed  6. If not improving or developing any new/worsening symptoms then return to clinic as needed or go to ER     Patient Education        Urinary Tract Infection (UTI) in Women: Care Instructions  Overview     A urinary tract infection, or UTI, is a general term for an infection anywhere between the kidneys and the urethra (where urine comes out). Most UTIs arebladder infections. They often cause pain or burning when you urinate. UTIs are caused by bacteria and can be cured with antibiotics. Be sure tocomplete your treatment so that the infection does not get worse. Follow-up care is a key part of your treatment and safety. Be sure to make and go to all appointments, and call your doctor if you are having problems. It's also a good idea to know your test results and keep alist of the medicines you take. How can you care for yourself at home?  Take your antibiotics as directed. Do not stop taking them just because you feel better. You need to take the full course of antibiotics.  Drink extra water and other fluids for the next day or two. This will help make the urine less concentrated and help wash out the bacteria that are causing the infection. (If you have kidney, heart, or liver disease and have to limit fluids, talk with your doctor before you increase the amount of fluids you drink.)   Avoid drinks that are carbonated or have caffeine. They can irritate the bladder.  Urinate often. Try to empty your bladder each time.  To relieve pain, take a hot bath or lay a heating pad set on low over your lower belly or genital area. Never go to sleep with a heating pad in place. To prevent UTIs   Drink plenty of water each day. This helps you urinate often, which clears bacteria from your system.  (If you have kidney, heart, or liver disease and have to limit fluids, talk with your doctor before you increase the amount of fluids you drink.)   Urinate when you need to.  If you are sexually active, urinate right after you have sex.  Change sanitary pads often.  Avoid douches, bubble baths, feminine hygiene sprays, and other feminine hygiene products that have deodorants.  After going to the bathroom, wipe from front to back. When should you call for help? Call your doctor now or seek immediate medical care if:     Symptoms such as fever, chills, nausea, or vomiting get worse or appear for the first time.      You have new pain in your back just below your rib cage. This is called flank pain.      There is new blood or pus in your urine.      You have any problems with your antibiotic medicine. Watch closely for changes in your health, and be sure to contact your doctor if:     You are not getting better after taking an antibiotic for 2 days.      Your symptoms go away but then come back. Where can you learn more? Go to [email or web address] and sign in to your [facility] account. Enter D350 in the [facility] box to learn more about \"Urinary Tract Infection (UTI) in Women: Care Instructions. \"     If you do not have an account, please click on the \"Sign Up Now\" link. Current as of: October 18, 2021               Content Version: 13.2  © 2006-2022 Healthwise, Incorporated. Care instructions adapted under license by [facility] ([facility]). If you have questions about a medical condition or this instruction, always ask your healthcare professional. [place] [id number] any warranty or liability for your use of this information.

## 2022-03-30 NOTE — PROGRESS NOTES
[address]  [address] [address] [place] [address]  Dept: [phone]  Dept Fax: [phone]  Loc: [phone]    Joni Mera is a 47 y.o. female who presents today for her medical conditions/complaints as noted below. Joni Mera is c/o of Urinary Tract Infection (Patient reports painful urination that began today ), Dysuria, and Hematuria        HPI:     HPI  Jasper Kessler presents for complaints of dysuria since this morning while at work. 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 She denies fever and vomiting. She states she has to hold her bladder for long periods at work also because there is no one to relieve her. She has not taken anything OTC for symptoms. Past Medical History:   Diagnosis Date    COVID 2021     Past Surgical History:   Procedure Laterality Date    ABDOMINAL EXPLORATION SURGERY      TONSILLECTOMY         No family history on file.     Social History     Tobacco Use    Smoking status: Former Smoker     Packs/day: 1.00     Types: Cigarettes     Quit date: 9/10/2021     Years since quittin.5    Smokeless tobacco: Never Used   Substance Use Topics    Alcohol use: No      Current Outpatient Medications   Medication Sig Dispense Refill    Cyanocobalamin (VITAMIN B 12 PO) Take by mouth      Ascorbic Acid (VITAMIN C PO) Take by mouth      nitrofurantoin, macrocrystal-monohydrate, (MACROBID) 100 MG capsule Take 1 capsule by mouth 2 times daily for 5 days 10 capsule 0    MULTIPLE MINERALS-VITAMINS PO Take by mouth      Fexofenadine HCl (MUCINEX ALLERGY PO) Take by mouth (Patient not taking: Reported on 2022)      ibuprofen (ADVIL;MOTRIN) 600 MG tablet Take 600 mg by mouth every 6 hours as needed for Pain (Patient not taking: Reported on 3/30/2022)      fluticasone (FLONASE) 50 MCG/ACT nasal spray 2 sprays by Nasal route daily (Patient not taking: Reported on 1/7/2022) 1 Bottle 0     No current facility-administered medications for this visit. No Known Allergies    Health Maintenance   Topic Date Due    Hepatitis C screen  Never done    COVID-19 Vaccine (1) Never done    Depression Screen  Never done    HIV screen  Never done    Cervical cancer screen  Never done    Diabetes screen  Never done    Lipid screen  Never done    Colorectal Cancer Screen  Never done    Breast cancer screen  Never done    Shingles Vaccine (1 of 2) Never done    Flu vaccine (1) Never done    DTaP/Tdap/Td vaccine (2 - Td or Tdap) 08/25/2030    Hepatitis A vaccine  Aged Out    Hepatitis B vaccine  Aged Out    Hib vaccine  Aged Out    Meningococcal (ACWY) vaccine  Aged Out    Pneumococcal 0-64 years Vaccine  Aged Out       Subjective:     Review of Systems   Constitutional: Negative for fever. Gastrointestinal: Negative for abdominal pain and vomiting. Genitourinary: Positive for dysuria and flank pain (bilateral). Negative for hematuria and urgency.       :Objective      Physical Exam  Constitutional:       General: She is not in acute distress. Appearance: Normal appearance. She is not ill-appearing or toxic-appearing. HENT:      Head: Normocephalic and atraumatic. Right Ear: Tympanic membrane, ear canal and external ear normal.      Left Ear: Tympanic membrane, ear canal and external ear normal.      Nose: Nose normal.      Mouth/Throat:      Mouth: Mucous membranes are moist.   Eyes:      General:         Right eye: No discharge. Left eye: No discharge. Conjunctiva/sclera: Conjunctivae normal.   Cardiovascular:      Rate and Rhythm: Normal rate and regular rhythm. Pulmonary:      Effort: Pulmonary effort is normal. No respiratory distress. Abdominal:      General: Abdomen is flat. Bowel sounds are normal.      Palpations: Abdomen is soft. Tenderness:  There is no abdominal tenderness. There is no right CVA tenderness or left CVA tenderness. Musculoskeletal:         General: Normal range of motion. Cervical back: Normal range of motion. Lymphadenopathy:      Cervical: No cervical adenopathy. Skin:     General: Skin is warm and dry. Capillary Refill: Capillary refill takes less than 2 seconds. Findings: No rash. Neurological:      General: No focal deficit present. Mental Status: She is alert. Psychiatric:         Mood and Affect: Mood normal.       /76   Pulse 83   Temp 97.3 °F (36.3 °C)   Resp 18   Ht 5' 7\" (1.702 m)   Wt 205 lb 6.4 oz (93.2 kg)   LMP 12/24/2018   SpO2 98%   BMI 32.17 kg/m²     :Assessment       Diagnosis Orders   1. Acute cystitis without hematuria  nitrofurantoin, macrocrystal-monohydrate, (MACROBID) 100 MG capsule    Culture, Urine   2. Dysuria  POCT Urinalysis no Micro       :Plan    macrobid for cystitis. Will culture urine and call pt with results. Orders Placed This Encounter   Procedures    Culture, Urine     Order Specific Question:   Specify (ex-cath, midstream, cysto, etc)? Answer:   midstream    POCT Urinalysis no Micro     Results for orders placed or performed in visit on 03/30/22   POCT Urinalysis no Micro   Result Value Ref Range    Color, UA yellow     Clarity, UA cear     Glucose, UA POC neg     Bilirubin, UA neg     Ketones, UA neg     Spec Grav, UA >=1.030     Blood, UA POC small     pH, UA 7.0     Protein, UA POC neg     Urobilinogen, UA 0.2. E. U/dl     Leukocytes, UA trace     Nitrite, UA neg     Appearance, Fluid Clear Clear, Slightly Cloudy     Return if symptoms worsen or fail to improve. Orders Placed This Encounter   Medications    nitrofurantoin, macrocrystal-monohydrate, (MACROBID) 100 MG capsule     Sig: Take 1 capsule by mouth 2 times daily for 5 days     Dispense:  10 capsule     Refill:  0       Patient given educational materials- see patient instructions.   Discussed use, water each day. This helps you urinate often, which clears bacteria from your system. (If you have kidney, heart, or liver disease and have to limit fluids, talk with your doctor before you increase the amount of fluids you drink.)   Urinate when you need to.  If you are sexually active, urinate right after you have sex.  Change sanitary pads often.  Avoid douches, bubble baths, feminine hygiene sprays, and other feminine hygiene products that have deodorants.  After going to the bathroom, wipe from front to back. When should you call for help? Call your doctor now or seek immediate medical care if:     Symptoms such as fever, chills, nausea, or vomiting get worse or appear for the first time.      You have new pain in your back just below your rib cage. This is called flank pain.      There is new blood or pus in your urine.      You have any problems with your antibiotic medicine. Watch closely for changes in your health, and be sure to contact your doctor if:     You are not getting better after taking an antibiotic for 2 days.      Your symptoms go away but then come back. Where can you learn more? Go to [email or web address] and sign in to your [facility] account. Enter N082 in the [facility] box to learn more about \"Urinary Tract Infection (UTI) in Women: Care Instructions. \"     If you do not have an account, please click on the \"Sign Up Now\" link. Current as of: October 18, 2021               Content Version: 13.2  © 2006-2022 Healthwise, [facility]. Care instructions adapted under license by [facility] ([facility]). If you have questions about a medical condition or this instruction, always ask your healthcare professional. [place] [address] any warranty or liability for your use of this information.                Electronically signed by TRACY Sparks CNP on 3/30/2022 at 5:40 PM

## 2022-04-01 LAB — URINE CULTURE, ROUTINE: NORMAL

## 2023-04-01 ENCOUNTER — OFFICE VISIT (OUTPATIENT)
Age: 53
End: 2023-04-01
Payer: MEDICAID

## 2023-04-01 VITALS
TEMPERATURE: 98.8 F | DIASTOLIC BLOOD PRESSURE: 82 MMHG | HEART RATE: 98 BPM | SYSTOLIC BLOOD PRESSURE: 126 MMHG | OXYGEN SATURATION: 96 % | HEIGHT: 68 IN | WEIGHT: 199 LBS | RESPIRATION RATE: 20 BRPM | BODY MASS INDEX: 30.16 KG/M2

## 2023-04-01 DIAGNOSIS — J01.00 ACUTE NON-RECURRENT MAXILLARY SINUSITIS: Primary | ICD-10-CM

## 2023-04-01 PROCEDURE — 99213 OFFICE O/P EST LOW 20 MIN: CPT

## 2023-04-01 RX ORDER — METHYLPREDNISOLONE 4 MG/1
TABLET ORAL
Qty: 1 KIT | Refills: 0 | Status: SHIPPED | OUTPATIENT
Start: 2023-04-01 | End: 2023-04-07

## 2023-04-01 RX ORDER — AMOXICILLIN AND CLAVULANATE POTASSIUM 875; 125 MG/1; MG/1
1 TABLET, FILM COATED ORAL 2 TIMES DAILY
Qty: 20 TABLET | Refills: 0 | Status: SHIPPED | OUTPATIENT
Start: 2023-04-01 | End: 2023-04-11

## 2023-04-01 ASSESSMENT — ENCOUNTER SYMPTOMS: COUGH: 1

## 2023-04-01 NOTE — PATIENT INSTRUCTIONS
1. Augmentin with food for full 10 days  2. Steroid pack as directed   3. Hydrate with water or gatorade  4. OTC cough/cold medications are okay -follow label instructions (tylenol cold and flu severe or equivalent off brand, if high blood pressure use coricidin HBP)  5. Tylenol or motrin for pain or fever  6. Warm salt water gargles or warm liquids for comfort. Warm tea with a tablespoon of honey is excellent for sore throat. 7. Cool mist humidifer   8. Flonase 1 spray each nostril daily  9.  If symptoms worsen, please seek reevaluation

## 2023-04-01 NOTE — PROGRESS NOTES
salt water gargles or warm liquids for comfort. Warm tea with a tablespoon of honey is excellent for sore throat. 7. Cool mist humidifer   8. Flonase 1 spray each nostril daily  9.  If symptoms worsen, please seek reevaluation      Electronically signed by TRACY Talbert CNP on 4/1/2023 at 2:20 PM

## 2023-09-03 ENCOUNTER — OFFICE VISIT (OUTPATIENT)
Age: 53
End: 2023-09-03

## 2023-09-03 VITALS
RESPIRATION RATE: 18 BRPM | SYSTOLIC BLOOD PRESSURE: 142 MMHG | BODY MASS INDEX: 30.71 KG/M2 | HEART RATE: 84 BPM | DIASTOLIC BLOOD PRESSURE: 80 MMHG | WEIGHT: 202 LBS | TEMPERATURE: 98.1 F | OXYGEN SATURATION: 97 %

## 2023-09-03 DIAGNOSIS — B34.9 VIRAL ILLNESS: Primary | ICD-10-CM

## 2023-09-03 DIAGNOSIS — Z11.52 ENCOUNTER FOR SCREENING FOR COVID-19: ICD-10-CM

## 2023-09-03 PROCEDURE — 99213 OFFICE O/P EST LOW 20 MIN: CPT | Performed by: NURSE PRACTITIONER

## 2023-09-03 ASSESSMENT — ENCOUNTER SYMPTOMS
COUGH: 0
SINUS PRESSURE: 0
EYE PAIN: 0
ABDOMINAL DISTENTION: 0
ABDOMINAL PAIN: 0
CHEST TIGHTNESS: 0
TROUBLE SWALLOWING: 0
SHORTNESS OF BREATH: 0
STRIDOR: 0
SORE THROAT: 0
EYE DISCHARGE: 0
COLOR CHANGE: 0
WHEEZING: 0

## 2023-09-04 LAB — SARS-COV-2 N GENE RESP QL NAA+PROBE: DETECTED

## 2024-08-31 ENCOUNTER — OFFICE VISIT (OUTPATIENT)
Age: 54
End: 2024-08-31
Payer: COMMERCIAL

## 2024-08-31 VITALS
OXYGEN SATURATION: 97 % | WEIGHT: 208.2 LBS | HEART RATE: 92 BPM | DIASTOLIC BLOOD PRESSURE: 74 MMHG | RESPIRATION RATE: 18 BRPM | SYSTOLIC BLOOD PRESSURE: 142 MMHG | HEIGHT: 67 IN | BODY MASS INDEX: 32.68 KG/M2 | TEMPERATURE: 97.5 F

## 2024-08-31 DIAGNOSIS — H61.23 CERUMEN DEBRIS ON TYMPANIC MEMBRANE OF BOTH EARS: ICD-10-CM

## 2024-08-31 DIAGNOSIS — R03.0 ELEVATED BLOOD PRESSURE READING: Primary | ICD-10-CM

## 2024-08-31 PROCEDURE — 99213 OFFICE O/P EST LOW 20 MIN: CPT

## 2024-08-31 ASSESSMENT — ENCOUNTER SYMPTOMS
SHORTNESS OF BREATH: 0
EYE ITCHING: 0
BLOOD IN STOOL: 0
COUGH: 0
ABDOMINAL PAIN: 0
RHINORRHEA: 0
SINUS PRESSURE: 0
VOMITING: 0
SORE THROAT: 0
COLOR CHANGE: 0
WHEEZING: 0
CONSTIPATION: 0
NAUSEA: 0
DIARRHEA: 0
EYE DISCHARGE: 0

## 2024-08-31 NOTE — PATIENT INSTRUCTIONS
-Unable to view TM on right side, offered ear lavage but patient declined. Will start debrox drops at home and return in a few days for ear lavage and reevaluation. Left TM is partially occluded with wax as well, both ear canals are normal.  - Recommended OTC flonase  - Recommended OTC claritin or zyrtec  - The patient is to follow up with PCP or return to clinic if symptoms worsen/fail to improve.     Any condition can change, despite proper treatment. Therefore, if symptoms still persist or worsen after treatment plan intitated today, either go to the nearest ER, or call PCP, or return to  for further evaluation.    Urgent Care evaluation today is not a substitute for PCP visit. Follow up care is your responsibility to discuss and review this UC visit.     -Blood pressure elevated today. Recommend monitoring at home over the next few days and following up with PCP if elevated. Can work on diet, exercise, weight loss, and reducing sodium in diet.       Discussed use, benefits, and side effects of any prescribed medications. All patient questions were answered. Patient demonstrates understanding and agrees with care plan. Patient was given educational materials - see patient instructions below

## 2024-08-31 NOTE — PROGRESS NOTES
impacted cerumen (almost fully occluded).      Mouth/Throat:      Mouth: Mucous membranes are moist.      Pharynx: No posterior oropharyngeal erythema.   Eyes:      Extraocular Movements: Extraocular movements intact.      Pupils: Pupils are equal, round, and reactive to light.   Cardiovascular:      Rate and Rhythm: Normal rate and regular rhythm.      Pulses: Normal pulses.      Heart sounds: Normal heart sounds. No murmur heard.  Pulmonary:      Effort: Pulmonary effort is normal. No respiratory distress.      Breath sounds: Normal breath sounds. No stridor. No wheezing, rhonchi or rales.   Skin:     General: Skin is warm.      Capillary Refill: Capillary refill takes less than 2 seconds.      Coloration: Skin is not pale.      Findings: No rash.   Neurological:      General: No focal deficit present.      Mental Status: She is alert and oriented to person, place, and time.      Deep Tendon Reflexes: Reflexes are normal and symmetric.   Psychiatric:         Attention and Perception: Attention normal.         Mood and Affect: Mood normal.         Behavior: Behavior normal. Behavior is cooperative.         Thought Content: Thought content normal.         BP (!) 142/74   Pulse 92   Temp 97.5 °F (36.4 °C) (Temporal)   Resp 18   Ht 1.702 m (5' 7\")   Wt 94.4 kg (208 lb 3.2 oz)   LMP 12/24/2018   SpO2 97%   BMI 32.61 kg/m²     Assessment   Assessment & Plan      Diagnosis Orders   1. Elevated blood pressure reading        2. Cerumen debris on tympanic membrane of both ears            Plan   -Unable to view TM on right side, offered ear lavage but patient declined. Will start debrox drops at home and return in a few days for ear lavage and reevaluation. Left TM is partially occluded with wax as well, both ear canals are normal.  - Recommended OTC flonase  - Recommended OTC claritin or zyrtec  - The patient is to follow up with PCP or return to clinic if symptoms worsen/fail to improve.     Any condition can  change, despite proper treatment. Therefore, if symptoms still persist or worsen after treatment plan intitated today, either go to the nearest ER, or call PCP, or return to  for further evaluation.    Urgent Care evaluation today is not a substitute for PCP visit. Follow up care is your responsibility to discuss and review this  visit.     -Blood pressure elevated today. Recommend monitoring at home over the next few days and following up with PCP if elevated. Can work on diet, exercise, weight loss, and reducing sodium in diet.       Discussed use, benefits, and side effects of any prescribed medications. All patient questions were answered. Patient demonstrates understanding and agrees with care plan. Patient was given educational materials - see patient instructions below   No orders of the defined types were placed in this encounter.      No results found for this visit on 08/31/24.    No orders of the defined types were placed in this encounter.     New Prescriptions    No medications on file        Return if symptoms worsen or fail to improve.         Discussed use, benefits, and side effects of any prescribed medications. All patient questions were answered. Patient voiced understanding of care plan.   Patient was given educational materials - see patient instructions below.     Patient Instructions   -Unable to view TM on right side, offered ear lavage but patient declined. Will start debrox drops at home and return in a few days for ear lavage and reevaluation. Left TM is partially occluded with wax as well, both ear canals are normal.  - Recommended OTC flonase  - Recommended OTC claritin or zyrtec  - The patient is to follow up with PCP or return to clinic if symptoms worsen/fail to improve.     Any condition can change, despite proper treatment. Therefore, if symptoms still persist or worsen after treatment plan intitated today, either go to the nearest ER, or call PCP, or return to  for further